# Patient Record
Sex: MALE | Race: WHITE | NOT HISPANIC OR LATINO | URBAN - METROPOLITAN AREA
[De-identification: names, ages, dates, MRNs, and addresses within clinical notes are randomized per-mention and may not be internally consistent; named-entity substitution may affect disease eponyms.]

---

## 2022-10-12 ENCOUNTER — INPATIENT (INPATIENT)
Facility: HOSPITAL | Age: 77
LOS: 4 days | Discharge: NURSING, REHAB OR LONG-TERM CARE | End: 2022-10-17
Attending: INTERNAL MEDICINE | Admitting: INTERNAL MEDICINE

## 2022-10-12 VITALS
WEIGHT: 259.93 LBS | RESPIRATION RATE: 18 BRPM | HEART RATE: 110 BPM | DIASTOLIC BLOOD PRESSURE: 66 MMHG | OXYGEN SATURATION: 98 % | SYSTOLIC BLOOD PRESSURE: 145 MMHG | TEMPERATURE: 99 F

## 2022-10-12 LAB
ALBUMIN SERPL ELPH-MCNC: 3.7 G/DL — SIGNIFICANT CHANGE UP (ref 3.5–5.2)
ALP SERPL-CCNC: 457 U/L — HIGH (ref 30–115)
ALT FLD-CCNC: 28 U/L — SIGNIFICANT CHANGE UP (ref 0–41)
ANION GAP SERPL CALC-SCNC: 17 MMOL/L — HIGH (ref 7–14)
APPEARANCE UR: ABNORMAL
AST SERPL-CCNC: 33 U/L — SIGNIFICANT CHANGE UP (ref 0–41)
BACTERIA # UR AUTO: ABNORMAL
BASOPHILS # BLD AUTO: 0.08 K/UL — SIGNIFICANT CHANGE UP (ref 0–0.2)
BASOPHILS NFR BLD AUTO: 0.9 % — SIGNIFICANT CHANGE UP (ref 0–1)
BILIRUB SERPL-MCNC: 0.3 MG/DL — SIGNIFICANT CHANGE UP (ref 0.2–1.2)
BILIRUB UR-MCNC: NEGATIVE — SIGNIFICANT CHANGE UP
BUN SERPL-MCNC: 74 MG/DL — CRITICAL HIGH (ref 10–20)
CALCIUM SERPL-MCNC: 8.3 MG/DL — LOW (ref 8.4–10.5)
CHLORIDE SERPL-SCNC: 108 MMOL/L — SIGNIFICANT CHANGE UP (ref 98–110)
CO2 SERPL-SCNC: 15 MMOL/L — LOW (ref 17–32)
COLOR SPEC: YELLOW — SIGNIFICANT CHANGE UP
CREAT SERPL-MCNC: 3.3 MG/DL — HIGH (ref 0.7–1.5)
DIFF PNL FLD: ABNORMAL
EGFR: 18 ML/MIN/1.73M2 — LOW
EOSINOPHIL # BLD AUTO: 0.16 K/UL — SIGNIFICANT CHANGE UP (ref 0–0.7)
EOSINOPHIL NFR BLD AUTO: 1.8 % — SIGNIFICANT CHANGE UP (ref 0–8)
EPI CELLS # UR: 1 /HPF — SIGNIFICANT CHANGE UP (ref 0–5)
GLUCOSE SERPL-MCNC: 89 MG/DL — SIGNIFICANT CHANGE UP (ref 70–99)
GLUCOSE UR QL: NEGATIVE — SIGNIFICANT CHANGE UP
HCT VFR BLD CALC: 27.7 % — LOW (ref 42–52)
HGB BLD-MCNC: 8.9 G/DL — LOW (ref 14–18)
HYALINE CASTS # UR AUTO: 2 /LPF — SIGNIFICANT CHANGE UP (ref 0–7)
IMM GRANULOCYTES NFR BLD AUTO: 0.6 % — HIGH (ref 0.1–0.3)
KETONES UR-MCNC: SIGNIFICANT CHANGE UP
LEUKOCYTE ESTERASE UR-ACNC: ABNORMAL
LYMPHOCYTES # BLD AUTO: 1.21 K/UL — SIGNIFICANT CHANGE UP (ref 1.2–3.4)
LYMPHOCYTES # BLD AUTO: 13.9 % — LOW (ref 20.5–51.1)
MCHC RBC-ENTMCNC: 28.4 PG — SIGNIFICANT CHANGE UP (ref 27–31)
MCHC RBC-ENTMCNC: 32.1 G/DL — SIGNIFICANT CHANGE UP (ref 32–37)
MCV RBC AUTO: 88.5 FL — SIGNIFICANT CHANGE UP (ref 80–94)
MONOCYTES # BLD AUTO: 0.67 K/UL — HIGH (ref 0.1–0.6)
MONOCYTES NFR BLD AUTO: 7.7 % — SIGNIFICANT CHANGE UP (ref 1.7–9.3)
NEUTROPHILS # BLD AUTO: 6.53 K/UL — HIGH (ref 1.4–6.5)
NEUTROPHILS NFR BLD AUTO: 75.1 % — SIGNIFICANT CHANGE UP (ref 42.2–75.2)
NITRITE UR-MCNC: NEGATIVE — SIGNIFICANT CHANGE UP
NRBC # BLD: 0 /100 WBCS — SIGNIFICANT CHANGE UP (ref 0–0)
NT-PROBNP SERPL-SCNC: 1174 PG/ML — HIGH (ref 0–300)
PH UR: 6 — SIGNIFICANT CHANGE UP (ref 5–8)
PLATELET # BLD AUTO: 378 K/UL — SIGNIFICANT CHANGE UP (ref 130–400)
POTASSIUM SERPL-MCNC: 5.4 MMOL/L — HIGH (ref 3.5–5)
POTASSIUM SERPL-SCNC: 5.4 MMOL/L — HIGH (ref 3.5–5)
PROT SERPL-MCNC: 6.7 G/DL — SIGNIFICANT CHANGE UP (ref 6–8)
PROT UR-MCNC: ABNORMAL
RBC # BLD: 3.13 M/UL — LOW (ref 4.7–6.1)
RBC # FLD: 15.9 % — HIGH (ref 11.5–14.5)
RBC CASTS # UR COMP ASSIST: 13 /HPF — HIGH (ref 0–4)
SARS-COV-2 RNA SPEC QL NAA+PROBE: SIGNIFICANT CHANGE UP
SODIUM SERPL-SCNC: 140 MMOL/L — SIGNIFICANT CHANGE UP (ref 135–146)
SP GR SPEC: 1.01 — SIGNIFICANT CHANGE UP (ref 1.01–1.03)
TROPONIN T SERPL-MCNC: 0.14 NG/ML — CRITICAL HIGH
UROBILINOGEN FLD QL: SIGNIFICANT CHANGE UP
WBC # BLD: 8.7 K/UL — SIGNIFICANT CHANGE UP (ref 4.8–10.8)
WBC # FLD AUTO: 8.7 K/UL — SIGNIFICANT CHANGE UP (ref 4.8–10.8)
WBC UR QL: 508 /HPF — HIGH (ref 0–5)

## 2022-10-12 PROCEDURE — 99285 EMERGENCY DEPT VISIT HI MDM: CPT

## 2022-10-12 PROCEDURE — 93010 ELECTROCARDIOGRAM REPORT: CPT

## 2022-10-12 PROCEDURE — 93970 EXTREMITY STUDY: CPT | Mod: 26

## 2022-10-12 PROCEDURE — 71045 X-RAY EXAM CHEST 1 VIEW: CPT | Mod: 26

## 2022-10-12 PROCEDURE — 99223 1ST HOSP IP/OBS HIGH 75: CPT

## 2022-10-12 RX ORDER — SODIUM BICARBONATE 1 MEQ/ML
650 SYRINGE (ML) INTRAVENOUS EVERY 8 HOURS
Refills: 0 | Status: DISCONTINUED | OUTPATIENT
Start: 2022-10-12 | End: 2022-10-14

## 2022-10-12 RX ORDER — HEPARIN SODIUM 5000 [USP'U]/ML
5000 INJECTION INTRAVENOUS; SUBCUTANEOUS EVERY 8 HOURS
Refills: 0 | Status: DISCONTINUED | OUTPATIENT
Start: 2022-10-12 | End: 2022-10-17

## 2022-10-12 RX ORDER — AMLODIPINE BESYLATE 2.5 MG/1
5 TABLET ORAL DAILY
Refills: 0 | Status: DISCONTINUED | OUTPATIENT
Start: 2022-10-12 | End: 2022-10-17

## 2022-10-12 NOTE — H&P ADULT - NSHPREVIEWOFSYSTEMS_GEN_ALL_CORE
REVIEW OF SYSTEMS:    CONSTITUTIONAL: + generalized weakness   EYES/ENT: No visual changes;  No vertigo or throat pain   NECK: No pain or stiffness  RESPIRATORY: No cough, wheezing, hemoptysis; + chronic sob at baseline   CARDIOVASCULAR: No chest pain or palpitations, + bilateral lower extremity edema  GASTROINTESTINAL: No abdominal or epigastric pain. No nausea, vomiting, or hematemesis; No constipation. +diarrhea   GENITOURINARY: No dysuria, frequency or hematuria  NEUROLOGICAL: No numbness or weakness  SKIN: No itching, rashes REVIEW OF SYSTEMS:    CONSTITUTIONAL: + generalized weakness   EYES/ENT: No visual changes;  No vertigo or throat pain   NECK: No pain or stiffness  RESPIRATORY: No cough, wheezing, hemoptysis; + chronic sob at baseline   CARDIOVASCULAR: No chest pain or palpitations, + bilateral lower extremity edema  GASTROINTESTINAL: No abdominal or epigastric pain. No nausea, vomiting, or hematemesis; No constipation. +diarrhea   GENITOURINARY: No dysuria, frequency or hematuria

## 2022-10-12 NOTE — ED ADULT NURSE NOTE - OBJECTIVE STATEMENT
pt presents to the ed c/o worsening b/l LE swelling. Pt was noted to have +4B/l LE edema, fluid filled blisters, b/l LE is leathery and has hyperpigmentation. Pt was noted to have redness around fluid filled blisters. Pt was noted to be coughing and expectorates light yellow phlegm. Pt reports BASS and denies chest pain or palpitations. B/l Breath sounds are clear.

## 2022-10-12 NOTE — H&P ADULT - NSHPLABSRESULTS_GEN_ALL_CORE
.  LABS:                         8.9    8.70  )-----------( 378      ( 12 Oct 2022 15:04 )             27.7     1012    140  |  108  |  74<HH>  ----------------------------<  89  5.4<H>   |  15<L>  |  3.3<H>    Ca    8.3<L>      12 Oct 2022 15:04    TPro  6.7  /  Alb  3.7  /  TBili  0.3  /  DBili  x   /  AST  33  /  ALT  28  /  AlkPhos  457<H>  1012      Urinalysis Basic - ( 12 Oct 2022 16:45 )    Color: Yellow / Appearance: Slightly Turbid / S.011 / pH: x  Gluc: x / Ketone: Trace  / Bili: Negative / Urobili: <2 mg/dL   Blood: x / Protein: 30 mg/dL / Nitrite: Negative   Leuk Esterase: Large / RBC: 13 /HPF /  /HPF   Sq Epi: x / Non Sq Epi: 1 /HPF / Bacteria: Many      Serum Pro-Brain Natriuretic Peptide: 1174 pg/mL (10-12 @ 15:04)        RADIOLOGY, EKG & ADDITIONAL TESTS: Reviewed.

## 2022-10-12 NOTE — H&P ADULT - ATTENDING COMMENTS
78 YO M w/ a PMH of DM2, HTN, and CKD3 who was BIBEMS for eval of progressive B/L LE swelling over the past x 6 months, worse over the past x 2 weeks. Associated w/ BASS and decreased ambulation. ROS is positive for ulcer on buttocks and left hip, otherwise negative.     In the ED, Chest X-Ray w/ left-sided ovoid opacity. Doppler of B/L LEs was negative.     FMHx: Reviewed, not relevant    Physical exam shows obese pt in NAD. VSS, afebrile, not hypoxic on RA. A&Ox3. Neuro exam without deficits, motor/sensory intact, no dysarthria, no facial asymmetry. Muscle strength/sensation intact. CTA B/L with no W/C/R. RRR, no M/G/R. ABD is soft and non-tender, normoactive BSs. B/L LEs w/ 2+ pitting edema, there is lipodermatosclerotic skin changes present. There are 2 small (<1x1 cm) sacral ulcers present and an ulcer on the left hip; minimal surrounding erythema, no purulence, no foul smell. Labs and radiology as above.     B/L LE swelling + dyspnea, suspect fluid overload from progression of CKD vs type 3 RCS. Echo. Monitor daily weights, Is&Os, and diet/fluid restriction. Send UA, urine lytes, urine pr:cr ratio, and trend BMP. Monitor urinary out-put. PTH and Vit D. Hold nephrotoxic agents. Restart home meds. Renal consult.     NAGMA (Non-AG Metabolic Acidosis) from Renal. Obtain VBG. Start on PO bicarb. Repeat BMP in the AM    Normocytic anemia, unknown baseline. Pt denies bleeding symptoms. Send anemia work-up. Replace as necessary.     Troponin elevation, suspected cause is CKD. Doubt ACS. No CP or EKG changes. Serial cardiac enzymes and EKGs.     Ovoid opacity on Chest X-Ray. Obtain CT-chest non-con    Hx of DM2 and HTN. Restart home meds, except as stated above. DVT PPX. Inform PCP of pt's admission to hospital. My note supersedes the residents note.     Date seen by Attending: 10/12/22 78 YO M w/ a PMH of DM2, HTN, and CKD3 who was BIBEMS for eval of progressive B/L LE swelling over the past x 6 months, worse over the past x 2 weeks. Associated w/ BASS and decreased ambulation. ROS is positive for ulcer on buttocks and left hip, otherwise negative.     In the ED, Chest X-Ray w/ left-sided ovoid opacity. Doppler of B/L LEs was negative.     FMHx: Reviewed, not relevant    Physical exam shows obese pt in NAD. VSS, afebrile, not hypoxic on RA. A&Ox3. Neuro exam without deficits, motor/sensory intact, no dysarthria, no facial asymmetry. Muscle strength/sensation intact. CTA B/L with no W/C/R. RRR, no M/G/R. ABD is soft and non-tender, normoactive BSs. B/L LEs w/ 2+ pitting edema, there is lipodermatosclerotic skin changes present. There are 2 small (<1x1 cm) sacral ulcers present and an ulcer on the left hip; minimal surrounding erythema, no purulence, no foul smell. Labs and radiology as above.     B/L LE swelling + dyspnea, suspect fluid overload from progression of CKD vs type 3 RCS. Echo. Monitor daily weights, Is&Os, and diet/fluid restriction. Send UA, urine lytes, urine pr:cr ratio, and trend BMP. Monitor urinary out-put. PTH and Vit D. Hold nephrotoxic agents. Restart home meds. Renal consult.     NAGMA (Non-AG Metabolic Acidosis) from Renal. Obtain VBG. Start on PO bicarb. Repeat BMP in the AM    Normocytic anemia, unknown baseline. Pt denies bleeding symptoms. Send anemia work-up. Replace as necessary.     Troponin elevation, suspected cause is CKD. Doubt ACS. No CP or EKG changes. Serial cardiac enzymes and EKGs.     Ovoid opacity on Chest X-Ray. Obtain CT-chest non-con    Sacral/hip ulcerations. Wound care. Burn eval.     Hx of DM2 and HTN. Restart home meds, except as stated above. DVT PPX. Inform PCP of pt's admission to hospital. My note supersedes the residents note.     Date seen by Attending: 10/12/22

## 2022-10-12 NOTE — H&P ADULT - HISTORY OF PRESENT ILLNESS
77 year old male with a history of DM poorly controlled and HTN presents to the ED with bilateral lower extremity edema. Patient reports that his legs have been swollen for months. In the past few weeks swelling has worsened and his family who is at bedside noted that patient has limited capability to ambulate despite use of a walker. He has been spending most of his time sitting and/or laying down and has developed decubitus ulcer on his buttock and proximal left leg. Also notes ulcerations of his right shin with clear liquid drainage. Admits to cramping in the legs and weakness bilaterally. No erythema of the legs, numbness/tingling, bruising. Admits to chronic dyspnea on exertion and urinary frequency. Further admits to excessive loose stools and diarrhea over the past few weeks, no blood. Denies fever/chills, chest pain, abdominal pain, nausea/vomiting, constipation. 77 year old male with a history of DM poorly controlled and HTN presents to the ED with bilateral lower extremity edema. Patient reports that his legs have been swollen for months. In the past few weeks swelling has worsened and his family who is at bedside noted that patient has limited capability to ambulate despite use of a walker. He has been spending most of his time sitting and/or laying down and has developed decubitus ulcer on his buttock and proximal left leg. Also notes ulcerations of his right shin with clear liquid drainage. Admits to cramping in the legs and weakness bilaterally. No erythema of the legs, numbness/tingling, bruising. Admits to chronic dyspnea on exertion and urinary frequency. Further admits to excessive loose stools and diarrhea over the past few weeks, no blood. Denies fever/chills, chest pain, abdominal pain, nausea/vomiting, constipation.  Patient has not seen a doctor since before the pandemic. His current medication list is  from 2020.     ED course : /66, , RR 18, T 98.7, Spo2 98 on RA . Labs significant for hgb 8.9, K 5.4, Hco3 15, AG 17, BUN 74, Cr 3.3, trop 0.14, BNP 1174. CXR notable for Patchy left basilar opacities, noting the dominant opacity is somewhat ovoid measuring up to 2.6 cm. B/l Lower extremity duplex showed no evidence of deep venous thrombosis or superficial thrombophlebitis in the bilateral lower extremities.

## 2022-10-12 NOTE — H&P ADULT - NSICDXPASTMEDICALHX_GEN_ALL_CORE_FT
PAST MEDICAL HISTORY:  CKD (chronic kidney disease)     Hypertension     Type 2 diabetes mellitus

## 2022-10-12 NOTE — H&P ADULT - NSHPPHYSICALEXAM_GEN_ALL_CORE
LOS:     VITALS:   T(C): 37 (10-12-22 @ 21:30), Max: 37.1 (10-12-22 @ 13:35)  HR: 105 (10-12-22 @ 21:30) (105 - 110)  BP: 137/63 (10-12-22 @ 21:30) (137/63 - 145/66)  RR: 18 (10-12-22 @ 21:30) (18 - 18)  SpO2: 100% (10-12-22 @ 21:30) (98% - 100%)    GENERAL: NAD, lying in bed comfortably  HEAD:  Atraumatic, Normocephalic  EYES: EOMI, PERRLA, conjunctiva and sclera clear  ENT: Moist mucous membranes  NECK: Supple, No JVD  CHEST/LUNG: Clear to auscultation bilaterally; No rales, rhonchi, wheezing, or rubs. Unlabored respirations  HEART: Regular rate and rhythm; No murmurs, rubs, or gallops  ABDOMEN: BSx4; obese, nontender   EXTREMITIES:  unable to assess pulses due to body habitus, B/l 3+ LE Edema L>R.  Chronic venous stasis skin changes. No actively oozing wounds   NERVOUS SYSTEM:  A&Ox3, no focal deficits   SKIN: No rashes or lesions

## 2022-10-12 NOTE — ED PROVIDER NOTE - PHYSICAL EXAMINATION
Physical Exam    Constitutional: No acute distress.   Eyes: Conjunctiva pink, Sclera clear, PERRLA, EOMI.  ENT: No sinus tenderness. No nasal discharge. No oropharyngeal erythema, edema, or exudates. Uvula midline.   Cardiovascular: tachycardic, regular rhythm. No noted murmurs rubs or gallops.  Respiratory: unlabored respiratory effort, clear to auscultation bilaterally no wheezing, rales or rhonchi  Gastrointestinal: Normal bowel sounds. soft, non distended, non-tender abdomen.   Musculoskeletal: supple neck, no midline tenderness. No joint or bony deformity.   Extremities: Bilateral lower extremity 3+ pitting edema. Ulceration over anterior plane of distal RLE with serious discharge. No erythema or bruising. No DFU.   Integumentary: warm, dry. Onychomycosis of toes bilaterally.   Neurologic: awake, alert, cranial nerves II-XII grossly intact, extremities’ motor and sensory functions grossly intact  Psychiatric: appropriate mood, appropriate affect

## 2022-10-12 NOTE — ED ADULT TRIAGE NOTE - CHIEF COMPLAINT QUOTE
Patient complaining of bilateral leg swelling x 2 weeks assocuated with difficulty ambulating,. Denies any shortness of breath

## 2022-10-12 NOTE — ED ADULT NURSE NOTE - DOES PATIENT HAVE ADVANCE DIRECTIVE
No
Patient presents to ER complaining of abdominal pain, patient reports burning pain on post surgery incisions, patient state had hernia surgery last week and has been experiencing pain since yesterday, patient ran out of pain medication, no signs/symptoms of infection, site are clean/intact, no redness/swelling/drainage noted, patient denies fever.

## 2022-10-12 NOTE — ED PROVIDER NOTE - NS ED ATTENDING STATEMENT MOD
This was a shared visit with the ADÁN. I reviewed and verified the documentation and independently performed the documented:

## 2022-10-12 NOTE — ED PROVIDER NOTE - CARE PLAN
1 Principal Discharge DX:	Bilateral lower extremity edema  Secondary Diagnosis:	Diabetes mellitus  Secondary Diagnosis:	Acute on chronic renal failure  Secondary Diagnosis:	Elevated troponin  Secondary Diagnosis:	Anemia

## 2022-10-12 NOTE — ED ADULT NURSE NOTE - CHIEF COMPLAINT QUOTE
Patient complaining of bilateral leg swelling x 2 weeks assocated with difficulty ambulating,. Denies any shortness of breath

## 2022-10-12 NOTE — ED PROVIDER NOTE - OBJECTIVE STATEMENT
77 year old male with a history of DM poorly controlled and HTN presents to the ED with bilateral lower extremity edema. Patient reports that his legs have been swollen for months. In the past few weeks swelling has worsened and his family who is at bedside noted that patient has limited capability to ambulate despite use of a walker. He has been spending most of his time sitting and/or laying down and has developed decubitus ulcer on his buttock and proximal left leg. Also notes ulcerations of his right shin with clear liquid drainage. Admits to cramping in the legs and weakness bilaterally. No erythema of the legs, numbness/tingling, bruising. Admits to chronic dyspnea on exertion and urinary frequency. Further admits to excessive loose stools and diarrhea over the past few weeks, no blood. Denies fever/chills, chest pain, abdominal pain, nausea/vomiting, constipation.

## 2022-10-12 NOTE — ED PROVIDER NOTE - NS ED ROS FT
Constitutional: (-) fever (+) generalized weakness  Eyes/ENT: (-) blurry vision, (-) epistaxis  Cardiovascular: (-) chest pain, (-) syncope  Respiratory: (-) cough, (+) chronic shortness of breath  Gastrointestinal: (-) abdominal pain (-) nausea (-) vomiting, (+) diarrhea  Musculoskeletal: (-) neck pain, (-) back pain, (-) joint pain  Extremities: (+) bilateral lower extremity edema (+) cramping (-) numbness/tingling (+) ulcerations  Neurological: (-) headache, (-) altered mental status  Psychiatric: (-) hallucinations  Allergic/Immunologic: (-) pruritus

## 2022-10-12 NOTE — ED ADULT NURSE NOTE - NSIMPLEMENTINTERV_GEN_ALL_ED
Implemented All Fall with Harm Risk Interventions:  [place] to call system. Call bell, personal items and telephone within reach. Instruct patient to call for assistance. Room bathroom lighting operational. Non-slip footwear when patient is off stretcher. Physically safe environment: no spills, clutter or unnecessary equipment. Stretcher in lowest position, wheels locked, appropriate side rails in place. Provide visual cue, wrist band, yellow gown, etc. Monitor gait and stability. Monitor for mental status changes and reorient to person, place, and time. Review medications for side effects contributing to fall risk. Reinforce activity limits and safety measures with patient and family. Provide visual clues: red socks.

## 2022-10-12 NOTE — ED PROVIDER NOTE - ATTENDING APP SHARED VISIT CONTRIBUTION OF CARE
76 yo m with pmh of htn and dm, bib family member for b/l leg swelling, difficulty walking and caring for himself.  pt lives by himself.  family member came from [place] because was concerned that he was complaining of diarrhea and seemed weak on the phone.  pt admits has had swelling of his legs for many months.  has sob but no cp.  admits had diarrhea for he past few weeks.  found by family member in unkempt house.  family is concerned that he is no longer safe to live by himself.  exam: nad, ncat, perrl, eomi, mmm, rrr, bibasilar rales, abd soft, nt, nd aox3, +pitting edema with stasis ulcers, decubitus ulcer on buttock and L leg.  imp: pt with leg swelling, rales, difficulty walking, will r/o fluid overload,  ekg, cxr, labs

## 2022-10-12 NOTE — H&P ADULT - ASSESSMENT
77 year old male with a history of DM poorly controlled and HTN presents to the ED with bilateral lower extremity edema. Patient reports that his legs have been swollen for months. Has not seen a doctor since before the pandemic. ED course : /66, , RR 18, T 98.7, Spo2 98 on RA . Labs significant for hgb 8.9, K 5.4, Hco3 15, AG 17, BUN 74, Cr 3.3, trop 0.14, BNP 1174. CXR notable for Patchy left basilar opacities, noting the dominant opacity is somewhat ovoid measuring up to 2.6 cm. B/l Lower extremity duplex showed no evidence of deep venous thrombosis or superficial thrombophlebitis in the bilateral lower extremities.        #Troponemia   #Elevated BNP   - likely 2/2 to Advance CKD   -not complaining of chest pain   -f/u echo     #Hx of DM  - F/u A1C , lipid profile   -start insulin if POC glucose consistently > 180     #Chronic Venous Stasis Changes of LE  #Sacral Wound   -BURN eval       #Hx of CKD  #Hyperkalemia   #YARIEL  -Unsure of baseline cr  - F/u urine studies  -RBUS ordered   -Start NaHCO3 650 q 8   -f/u phos level  -nephro eval       #Hx of HTN   - BP elevated on presentation 145/66  -  Home meds were: Chlorthalidone 50, Amlodipine/valsartan 10/320, Bystolic 5   - Start amlodipine 5 mg   - Monitor BP     #Chronic SOB  #Former Smoker  #Suspicious L opacity   -ct chest non con ordered   - pulm consult   -will need pfts outpatient     #Normocytic Anemia  #Suspected Anemia of chronic disease  -f/u Iron studies, b12, folate   -keep active type and screen   - Will need age appropriate cancer screening outpatient     #+ UA  -no dysuria , suprapubic pain , cva tenderness   -monitor off abx  -f/u urine cx      DVT ppx: Heparin  Diet: DASH  Activity: IAT  Code: FULL     Dispo: Acute

## 2022-10-13 LAB
A1C WITH ESTIMATED AVERAGE GLUCOSE RESULT: 5.8 % — HIGH (ref 4–5.6)
ANION GAP SERPL CALC-SCNC: 13 MMOL/L — SIGNIFICANT CHANGE UP (ref 7–14)
ANION GAP SERPL CALC-SCNC: 17 MMOL/L — HIGH (ref 7–14)
BASOPHILS # BLD AUTO: 0.1 K/UL — SIGNIFICANT CHANGE UP (ref 0–0.2)
BASOPHILS NFR BLD AUTO: 1.2 % — HIGH (ref 0–1)
BUN SERPL-MCNC: 67 MG/DL — CRITICAL HIGH (ref 10–20)
BUN SERPL-MCNC: 71 MG/DL — CRITICAL HIGH (ref 10–20)
CALCIUM SERPL-MCNC: 7.9 MG/DL — LOW (ref 8.4–10.5)
CALCIUM SERPL-MCNC: 8.2 MG/DL — LOW (ref 8.4–10.5)
CHLORIDE SERPL-SCNC: 112 MMOL/L — HIGH (ref 98–110)
CHLORIDE SERPL-SCNC: 112 MMOL/L — HIGH (ref 98–110)
CHOLEST SERPL-MCNC: 128 MG/DL — SIGNIFICANT CHANGE UP
CO2 SERPL-SCNC: 14 MMOL/L — LOW (ref 17–32)
CO2 SERPL-SCNC: 17 MMOL/L — SIGNIFICANT CHANGE UP (ref 17–32)
CREAT SERPL-MCNC: 3.2 MG/DL — HIGH (ref 0.7–1.5)
CREAT SERPL-MCNC: 3.7 MG/DL — HIGH (ref 0.7–1.5)
EGFR: 16 ML/MIN/1.73M2 — LOW
EGFR: 19 ML/MIN/1.73M2 — LOW
EOSINOPHIL # BLD AUTO: 0.19 K/UL — SIGNIFICANT CHANGE UP (ref 0–0.7)
EOSINOPHIL NFR BLD AUTO: 2.2 % — SIGNIFICANT CHANGE UP (ref 0–8)
ESTIMATED AVERAGE GLUCOSE: 120 MG/DL — HIGH (ref 68–114)
GLUCOSE SERPL-MCNC: 133 MG/DL — HIGH (ref 70–99)
GLUCOSE SERPL-MCNC: 140 MG/DL — HIGH (ref 70–99)
HCT VFR BLD CALC: 25.3 % — LOW (ref 42–52)
HCT VFR BLD CALC: 25.6 % — LOW (ref 42–52)
HDLC SERPL-MCNC: 49 MG/DL — SIGNIFICANT CHANGE UP
HGB BLD-MCNC: 8.2 G/DL — LOW (ref 14–18)
HGB BLD-MCNC: 8.3 G/DL — LOW (ref 14–18)
IMM GRANULOCYTES NFR BLD AUTO: 1.1 % — HIGH (ref 0.1–0.3)
LIPID PNL WITH DIRECT LDL SERPL: 49 MG/DL — SIGNIFICANT CHANGE UP
LYMPHOCYTES # BLD AUTO: 1.07 K/UL — LOW (ref 1.2–3.4)
LYMPHOCYTES # BLD AUTO: 12.6 % — LOW (ref 20.5–51.1)
MAGNESIUM SERPL-MCNC: 1.2 MG/DL — LOW (ref 1.8–2.4)
MCHC RBC-ENTMCNC: 28.3 PG — SIGNIFICANT CHANGE UP (ref 27–31)
MCHC RBC-ENTMCNC: 28.3 PG — SIGNIFICANT CHANGE UP (ref 27–31)
MCHC RBC-ENTMCNC: 32.4 G/DL — SIGNIFICANT CHANGE UP (ref 32–37)
MCHC RBC-ENTMCNC: 32.4 G/DL — SIGNIFICANT CHANGE UP (ref 32–37)
MCV RBC AUTO: 87.2 FL — SIGNIFICANT CHANGE UP (ref 80–94)
MCV RBC AUTO: 87.4 FL — SIGNIFICANT CHANGE UP (ref 80–94)
MONOCYTES # BLD AUTO: 0.74 K/UL — HIGH (ref 0.1–0.6)
MONOCYTES NFR BLD AUTO: 8.7 % — SIGNIFICANT CHANGE UP (ref 1.7–9.3)
NEUTROPHILS # BLD AUTO: 6.33 K/UL — SIGNIFICANT CHANGE UP (ref 1.4–6.5)
NEUTROPHILS NFR BLD AUTO: 74.2 % — SIGNIFICANT CHANGE UP (ref 42.2–75.2)
NON HDL CHOLESTEROL: 79 MG/DL — SIGNIFICANT CHANGE UP
NRBC # BLD: 0 /100 WBCS — SIGNIFICANT CHANGE UP (ref 0–0)
NRBC # BLD: 0 /100 WBCS — SIGNIFICANT CHANGE UP (ref 0–0)
OSMOLALITY UR: 341 MOS/KG — SIGNIFICANT CHANGE UP (ref 50–1200)
PHOSPHATE SERPL-MCNC: 4.9 MG/DL — SIGNIFICANT CHANGE UP (ref 2.1–4.9)
PLATELET # BLD AUTO: 351 K/UL — SIGNIFICANT CHANGE UP (ref 130–400)
PLATELET # BLD AUTO: 354 K/UL — SIGNIFICANT CHANGE UP (ref 130–400)
POTASSIUM SERPL-MCNC: 4.8 MMOL/L — SIGNIFICANT CHANGE UP (ref 3.5–5)
POTASSIUM SERPL-MCNC: 4.9 MMOL/L — SIGNIFICANT CHANGE UP (ref 3.5–5)
POTASSIUM SERPL-SCNC: 4.8 MMOL/L — SIGNIFICANT CHANGE UP (ref 3.5–5)
POTASSIUM SERPL-SCNC: 4.9 MMOL/L — SIGNIFICANT CHANGE UP (ref 3.5–5)
POTASSIUM UR-SCNC: 8 MMOL/L — SIGNIFICANT CHANGE UP
RBC # BLD: 2.9 M/UL — LOW (ref 4.7–6.1)
RBC # BLD: 2.93 M/UL — LOW (ref 4.7–6.1)
RBC # FLD: 15.7 % — HIGH (ref 11.5–14.5)
RBC # FLD: 15.8 % — HIGH (ref 11.5–14.5)
SODIUM SERPL-SCNC: 142 MMOL/L — SIGNIFICANT CHANGE UP (ref 135–146)
SODIUM SERPL-SCNC: 143 MMOL/L — SIGNIFICANT CHANGE UP (ref 135–146)
SODIUM UR-SCNC: 128 MMOL/L — SIGNIFICANT CHANGE UP
TRIGL SERPL-MCNC: 152 MG/DL — HIGH
TROPONIN T SERPL-MCNC: 0.11 NG/ML — CRITICAL HIGH
TROPONIN T SERPL-MCNC: 0.12 NG/ML — CRITICAL HIGH
WBC # BLD: 8.52 K/UL — SIGNIFICANT CHANGE UP (ref 4.8–10.8)
WBC # BLD: 8.82 K/UL — SIGNIFICANT CHANGE UP (ref 4.8–10.8)
WBC # FLD AUTO: 8.52 K/UL — SIGNIFICANT CHANGE UP (ref 4.8–10.8)
WBC # FLD AUTO: 8.82 K/UL — SIGNIFICANT CHANGE UP (ref 4.8–10.8)

## 2022-10-13 PROCEDURE — 76770 US EXAM ABDO BACK WALL COMP: CPT | Mod: 26

## 2022-10-13 PROCEDURE — 71250 CT THORAX DX C-: CPT | Mod: 26

## 2022-10-13 PROCEDURE — 93306 TTE W/DOPPLER COMPLETE: CPT | Mod: 26

## 2022-10-13 PROCEDURE — 99222 1ST HOSP IP/OBS MODERATE 55: CPT

## 2022-10-13 PROCEDURE — 99221 1ST HOSP IP/OBS SF/LOW 40: CPT | Mod: FS

## 2022-10-13 PROCEDURE — 99233 SBSQ HOSP IP/OBS HIGH 50: CPT

## 2022-10-13 RX ORDER — FUROSEMIDE 40 MG
40 TABLET ORAL ONCE
Refills: 0 | Status: COMPLETED | OUTPATIENT
Start: 2022-10-13 | End: 2022-10-13

## 2022-10-13 RX ORDER — SODIUM ZIRCONIUM CYCLOSILICATE 10 G/10G
10 POWDER, FOR SUSPENSION ORAL DAILY
Refills: 0 | Status: DISCONTINUED | OUTPATIENT
Start: 2022-10-13 | End: 2022-10-13

## 2022-10-13 RX ORDER — INFLUENZA VIRUS VACCINE 15; 15; 15; 15 UG/.5ML; UG/.5ML; UG/.5ML; UG/.5ML
0.7 SUSPENSION INTRAMUSCULAR ONCE
Refills: 0 | Status: DISCONTINUED | OUTPATIENT
Start: 2022-10-13 | End: 2022-10-17

## 2022-10-13 RX ORDER — MAGNESIUM SULFATE 500 MG/ML
2 VIAL (ML) INJECTION
Refills: 0 | Status: COMPLETED | OUTPATIENT
Start: 2022-10-13 | End: 2022-10-13

## 2022-10-13 RX ADMIN — Medication 650 MILLIGRAM(S): at 17:54

## 2022-10-13 RX ADMIN — Medication 650 MILLIGRAM(S): at 08:46

## 2022-10-13 RX ADMIN — HEPARIN SODIUM 5000 UNIT(S): 5000 INJECTION INTRAVENOUS; SUBCUTANEOUS at 21:24

## 2022-10-13 RX ADMIN — AMLODIPINE BESYLATE 5 MILLIGRAM(S): 2.5 TABLET ORAL at 08:46

## 2022-10-13 RX ADMIN — Medication 25 GRAM(S): at 22:32

## 2022-10-13 RX ADMIN — HEPARIN SODIUM 5000 UNIT(S): 5000 INJECTION INTRAVENOUS; SUBCUTANEOUS at 08:46

## 2022-10-13 RX ADMIN — Medication 40 MILLIGRAM(S): at 10:54

## 2022-10-13 RX ADMIN — HEPARIN SODIUM 5000 UNIT(S): 5000 INJECTION INTRAVENOUS; SUBCUTANEOUS at 17:53

## 2022-10-13 RX ADMIN — Medication 650 MILLIGRAM(S): at 21:24

## 2022-10-13 NOTE — PROGRESS NOTE ADULT - ASSESSMENT
77 year old male with a history of DM and HTN presents to the ED with bilateral lower extremity edema.    #Acute on Chronic HFpEF  #Chronic venous stasis b/l LE  #Left basilar opacity  #LLE ulcer  B/l Lower extremity duplex showed no evidence of deep venous thrombosis or superficial thrombophlebitis in the bilateral lower extremities.  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 Glucose was 89 on BMP  - f/u hba1c  - Start ISS if needed    #HTN  - Cont amlodipine 5mg qD    #CKD Stage 4  Pt endorses his creatinine has always been elevated, unsure of number but believes high 2s  - Cont sodium bicarb 650 q8h  - Nephro eval    DVT PPX, heparin    #Progress Note Handoff  Pending (specify): Burn Eval, Diurese as tolerated, nephro eval  Family discussion: d/w pt regarding eval for LE edema  Disposition:  77 year old male with a history of DM and HTN presents to the ED with bilateral lower extremity edema.    #Acute on Chronic HFpEF  #Chronic venous stasis b/l LE  #Left basilar opacity  #LLE ulcer  B/l Lower extremity duplex showed no evidence of deep venous thrombosis or superficial thrombophlebitis in the bilateral lower extremities.  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 Glucose was 89 on BMP  - f/u hba1c  - Start ISS if needed    #HTN  - Cont amlodipine 5mg qD    DVT PPX, heparin    #Progress Note Handoff  Pending (specify): Burn Eval, Diurese as tolerated, nephro eval  Family discussion: d/w pt regarding eval for LE edema  Disposition:

## 2022-10-13 NOTE — CONSULT NOTE ADULT - ASSESSMENT
Pt with DM, HTN, CKD 4, presents with increased LE edema and exertional dyspnes.    CKD 4 - will obtain old records, appears to be close to baseline creat ~ 3.0 as per pt  Kidney and bladders sono - no hydro, thin cortex  UA proteinuria 30, etiology likely HTN, DM  check SPC ratio  strict Is and OS  check phos, iPTH,   Hyperkalemia - low K diet  start Lokelma 10 grams tdaily    Met acidosis due to CKD  - Na bicarb 650 tid    Fluid overload - cont Lasix 40 IV qd and switch to po in 2 days  check 2D Echo    Anemia - check iron stores please, will need Venofer and DANIELLA most likely

## 2022-10-13 NOTE — PROGRESS NOTE ADULT - SUBJECTIVE AND OBJECTIVE BOX
NEVIN SHELL  77y, Male  Allergy: No Known Allergies    Hospital Day: 1d    Patient seen and examined. No acute events overnight    PMH/PSH:  PAST MEDICAL & SURGICAL HISTORY:  CKD (chronic kidney disease)      Hypertension      Type 2 diabetes mellitus          VITALS:  T(F): 98.6 (10-12-22 @ 21:30), Max: 98.7 (10-12-22 @ 13:35)  HR: 105 (10-12-22 @ 21:30)  BP: 137/63 (10-12-22 @ 21:30) (137/63 - 145/66)  RR: 18 (10-12-22 @ 21:30)  SpO2: 100% (10-12-22 @ 21:30)    TESTS & MEASUREMENTS:  Weight (Kg): 117.9 (10-12-22 @ 13:35)                            8.9    8.70  )-----------( 378      ( 12 Oct 2022 15:04 )             27.7       10-12    140  |  108  |  74<HH>  ----------------------------<  89  5.4<H>   |  15<L>  |  3.3<H>    Ca    8.3<L>      12 Oct 2022 15:04    TPro  6.7  /  Alb  3.7  /  TBili  0.3  /  DBili  x   /  AST  33  /  ALT  28  /  AlkPhos  457<H>  10-12    LIVER FUNCTIONS - ( 12 Oct 2022 15:04 )  Alb: 3.7 g/dL / Pro: 6.7 g/dL / ALK PHOS: 457 U/L / ALT: 28 U/L / AST: 33 U/L / GGT: x           CARDIAC MARKERS ( 12 Oct 2022 22:45 )  x     / 0.12 ng/mL / x     / x     / x      CARDIAC MARKERS ( 12 Oct 2022 15:04 )  x     / 0.14 ng/mL / x     / x     / x            Urinalysis Basic - ( 12 Oct 2022 16:45 )    Color: Yellow / Appearance: Slightly Turbid / S.011 / pH: x  Gluc: x / Ketone: Trace  / Bili: Negative / Urobili: <2 mg/dL   Blood: x / Protein: 30 mg/dL / Nitrite: Negative   Leuk Esterase: Large / RBC: 13 /HPF /  /HPF   Sq Epi: x / Non Sq Epi: 1 /HPF / Bacteria: Many        RADIOLOGY & ADDITIONAL TESTS:    RECENT DIAGNOSTIC ORDERS:  Basic Metabolic Panel: 16:00 (10-13-22 @ 12:01)  Complete Blood Count: 16:00 (10-13-22 @ 12:01)  Troponin T, Serum: 11:00 (10-13-22 @ 07:18)  Blood Gas Profile - Cord Venous: AM Sched. Collection:13-Oct-2022 04:30 (10-12-22 @ 22:54)  Lipid Profile: AM Sched. Collection: 13-Oct-2022 04:30 (10-12-22 @ 22:20)  A1C with Estimated Average Glucose: AM Sched. Collection: 13-Oct-2022 04:30 (10-12-22 @ 22:20)  CT Chest No Cont: Routine   Indication: l opacity  Transport: Stretcher-Crib  Exam Completed (10-12-22 @ 22:19)  Diet, DASH/TLC:   Sodium & Cholesterol Restricted (10-12-22 @ 22:16)  Phosphorus Level, Serum: AM Sched. Collection: 13-Oct-2022 04:30 (10-12-22 @ 22:15)  Potassium, Random Urine: STAT (10-12-22 @ 22:15)  Urea Nitrogen,  Random Urine: STAT (10-12-22 @ 22:15)  Osmolality, Random Urine: STAT (10-12-22 @ 22:15)  Protein/Creatinine Ratio, Urine: STAT (10-12-22 @ 22:15)  Sodium, Random Urine: STAT (10-12-22 @ 22:15)  Ferritin, Serum: AM Sched. Collection: 13-Oct-2022 04:30 (10-12-22 @ 22:15)  Iron with Total Binding Capacity: AM Sched. Collection: 13-Oct-2022 04:30 (10-12-22 @ 22:15)  ABO Rh Confirmatory Specimen: AM  Addl Info: Conditional: ABO Rh Confirmatory Specimen (10-12-22 @ 22:15)  Type + Screen: AM  Sched. Collection:13-Oct-2022 04:30 (10-12-22 @ 22:15)  Magnesium, Serum: AM Sched. Collection: 13-Oct-2022 04:30 (10-12-22 @ 22:15)  Basic Metabolic Panel: AM Sched. Collection: 13-Oct-2022 04:30 (10-12-22 @ 22:15)  Complete Blood Count + Automated Diff: AM Sched. Collection: 13-Oct-2022 04:30 (10-12-22 @ 22:15)  Culture - Urine: Routine  Specimen Source: Clean Catch (Midstream) (10-12-22 @ 14:23)      MEDICATIONS:  MEDICATIONS  (STANDING):  amLODIPine   Tablet 5 milliGRAM(s) Oral daily  heparin   Injectable 5000 Unit(s) SubCutaneous every 8 hours  sodium bicarbonate 650 milliGRAM(s) Oral every 8 hours    MEDICATIONS  (PRN):      HOME MEDICATIONS:      REVIEW OF SYSTEMS:  All other review of systems is negative unless indicated above.     PHYSICAL EXAM:  PHYSICAL EXAM:  GENERAL: NAD, well-developed  HEAD:  Atraumatic, Normocephalic  NECK: Supple, No JVD  CHEST/LUNG: Clear to auscultation bilaterally; No wheeze  HEART: Regular rate and rhythm; No murmurs, rubs, or gallops  ABDOMEN: Soft, Nontender, Nondistended; Bowel sounds present  EXTREMITIES:  2+ Peripheral Pulses, 2-3+ pitting edema b/l LE, chronic venous stasis changes, superficial ulcer RLE without drainage/pus  SKIN: No rashes or lesions

## 2022-10-13 NOTE — CONSULT NOTE ADULT - ASSESSMENT
Impression:    Acute volume overload  CHF exacerbation  JONH on CKD  Left lower lung opacity      Plan:  continue diuresis as tolerated  obtain CT chest  no evidence for active infection  obtain procalcitonin

## 2022-10-13 NOTE — CONSULT NOTE ADULT - ATTENDING COMMENTS
Impression:    Left lower lobe consolidation Vs atelectasis  Less likely PNA  Chronic kidney disease  Heart failure with preserved ejection fraction   Lower extremity edema     Impression and plan are my own

## 2022-10-13 NOTE — CONSULT NOTE ADULT - ASSESSMENT
Impression:    Left lower lobe consolidation Vs atelectasis  less likely PNA      Plan:    patient clinically appearing well  no fever leukocytosis, no cough or pleuritic chest pain  obtain procalcitonin  off abx for now  repeat CT in 6 weeks to f/u for resolution Impression:    Left lower lobe consolidation Vs atelectasis  Less likely PNA  Chronic kidney disease  HFPEF      Plan:    CT chest reviewed with LLL opacity with air bronchograms.  This could represent atelectasis vs consolidation in the right cinical setting.   Awaiting official CT chest report/read.   He has no fevers, no leukocytosis, no purulent phlegm/cough.   Check procalcitonin level. If negative then no need for abx.   Recommend speech and swallow evaluation to rule out aspiration.   Diuretic management per primary team and nephrology.   Given smoking history it would be reasonable to repeat CT chest in 6 weeks to document resolution/stability.   Will follow.

## 2022-10-13 NOTE — CONSULT NOTE ADULT - ASSESSMENT
78 yo male admitted for bilateral lower extremity edema/lymphedema  - no open wounds, no surgical intervention warranted at this time  - continue local wound care: wash with soap and water. hydrogel and allovyn to sacrum BID.   - consider vascular evaluation

## 2022-10-14 ENCOUNTER — TRANSCRIPTION ENCOUNTER (OUTPATIENT)
Age: 77
End: 2022-10-14

## 2022-10-14 LAB
A1C WITH ESTIMATED AVERAGE GLUCOSE RESULT: 5.9 % — HIGH (ref 4–5.6)
ALBUMIN SERPL ELPH-MCNC: 2.6 G/DL — LOW (ref 3.5–5.2)
ALP SERPL-CCNC: 357 U/L — HIGH (ref 30–115)
ALT FLD-CCNC: 19 U/L — SIGNIFICANT CHANGE UP (ref 0–41)
ANION GAP SERPL CALC-SCNC: 18 MMOL/L — HIGH (ref 7–14)
AST SERPL-CCNC: 24 U/L — SIGNIFICANT CHANGE UP (ref 0–41)
BILIRUB SERPL-MCNC: <0.2 MG/DL — SIGNIFICANT CHANGE UP (ref 0.2–1.2)
BUN SERPL-MCNC: 63 MG/DL — CRITICAL HIGH (ref 10–20)
CALCIUM SERPL-MCNC: 8.2 MG/DL — LOW (ref 8.4–10.5)
CALCIUM SERPL-MCNC: 8.3 MG/DL — LOW (ref 8.4–10.5)
CHLORIDE SERPL-SCNC: 113 MMOL/L — HIGH (ref 98–110)
CO2 SERPL-SCNC: 13 MMOL/L — LOW (ref 17–32)
CREAT ?TM UR-MCNC: 18 MG/DL — SIGNIFICANT CHANGE UP
CREAT SERPL-MCNC: 3.3 MG/DL — HIGH (ref 0.7–1.5)
EGFR: 18 ML/MIN/1.73M2 — LOW
ESTIMATED AVERAGE GLUCOSE: 123 MG/DL — HIGH (ref 68–114)
FERRITIN SERPL-MCNC: 346 NG/ML — SIGNIFICANT CHANGE UP (ref 30–400)
GLUCOSE SERPL-MCNC: 112 MG/DL — HIGH (ref 70–99)
HCT VFR BLD CALC: 26.7 % — LOW (ref 42–52)
HGB BLD-MCNC: 8.5 G/DL — LOW (ref 14–18)
MAGNESIUM SERPL-MCNC: 1.8 MG/DL — SIGNIFICANT CHANGE UP (ref 1.8–2.4)
MCHC RBC-ENTMCNC: 27.9 PG — SIGNIFICANT CHANGE UP (ref 27–31)
MCHC RBC-ENTMCNC: 31.8 G/DL — LOW (ref 32–37)
MCV RBC AUTO: 87.5 FL — SIGNIFICANT CHANGE UP (ref 80–94)
NRBC # BLD: 0 /100 WBCS — SIGNIFICANT CHANGE UP (ref 0–0)
PLATELET # BLD AUTO: 287 K/UL — SIGNIFICANT CHANGE UP (ref 130–400)
POTASSIUM SERPL-MCNC: 5.5 MMOL/L — HIGH (ref 3.5–5)
POTASSIUM SERPL-SCNC: 5.5 MMOL/L — HIGH (ref 3.5–5)
PROCALCITONIN SERPL-MCNC: 0.78 NG/ML — HIGH (ref 0.02–0.1)
PROT ?TM UR-MCNC: 11 MG/DLG/24H — SIGNIFICANT CHANGE UP
PROT SERPL-MCNC: 5.7 G/DL — LOW (ref 6–8)
PROT/CREAT UR-RTO: 0.6 RATIO — HIGH (ref 0–0.2)
PTH-INTACT FLD-MCNC: 118 PG/ML — HIGH (ref 15–65)
RBC # BLD: 3.05 M/UL — LOW (ref 4.7–6.1)
RBC # FLD: 15.9 % — HIGH (ref 11.5–14.5)
SODIUM SERPL-SCNC: 144 MMOL/L — SIGNIFICANT CHANGE UP (ref 135–146)
UUN UR-MCNC: 194 MG/DL — SIGNIFICANT CHANGE UP
WBC # BLD: 8.22 K/UL — SIGNIFICANT CHANGE UP (ref 4.8–10.8)
WBC # FLD AUTO: 8.22 K/UL — SIGNIFICANT CHANGE UP (ref 4.8–10.8)

## 2022-10-14 PROCEDURE — 99233 SBSQ HOSP IP/OBS HIGH 50: CPT

## 2022-10-14 RX ORDER — METOPROLOL TARTRATE 50 MG
25 TABLET ORAL DAILY
Refills: 0 | Status: DISCONTINUED | OUTPATIENT
Start: 2022-10-14 | End: 2022-10-17

## 2022-10-14 RX ORDER — SODIUM BICARBONATE 1 MEQ/ML
1300 SYRINGE (ML) INTRAVENOUS THREE TIMES A DAY
Refills: 0 | Status: DISCONTINUED | OUTPATIENT
Start: 2022-10-14 | End: 2022-10-17

## 2022-10-14 RX ORDER — FUROSEMIDE 40 MG
40 TABLET ORAL DAILY
Refills: 0 | Status: DISCONTINUED | OUTPATIENT
Start: 2022-10-15 | End: 2022-10-17

## 2022-10-14 RX ORDER — SODIUM ZIRCONIUM CYCLOSILICATE 10 G/10G
10 POWDER, FOR SUSPENSION ORAL DAILY
Refills: 0 | Status: DISCONTINUED | OUTPATIENT
Start: 2022-10-14 | End: 2022-10-14

## 2022-10-14 RX ORDER — FUROSEMIDE 40 MG
40 TABLET ORAL DAILY
Refills: 0 | Status: DISCONTINUED | OUTPATIENT
Start: 2022-10-14 | End: 2022-10-14

## 2022-10-14 RX ADMIN — HEPARIN SODIUM 5000 UNIT(S): 5000 INJECTION INTRAVENOUS; SUBCUTANEOUS at 05:30

## 2022-10-14 RX ADMIN — SODIUM ZIRCONIUM CYCLOSILICATE 10 GRAM(S): 10 POWDER, FOR SUSPENSION ORAL at 11:59

## 2022-10-14 RX ADMIN — HEPARIN SODIUM 5000 UNIT(S): 5000 INJECTION INTRAVENOUS; SUBCUTANEOUS at 12:30

## 2022-10-14 RX ADMIN — Medication 1300 MILLIGRAM(S): at 21:06

## 2022-10-14 RX ADMIN — Medication 1300 MILLIGRAM(S): at 14:34

## 2022-10-14 RX ADMIN — HEPARIN SODIUM 5000 UNIT(S): 5000 INJECTION INTRAVENOUS; SUBCUTANEOUS at 21:06

## 2022-10-14 RX ADMIN — Medication 650 MILLIGRAM(S): at 05:29

## 2022-10-14 RX ADMIN — AMLODIPINE BESYLATE 5 MILLIGRAM(S): 2.5 TABLET ORAL at 05:30

## 2022-10-14 RX ADMIN — Medication 25 MILLIGRAM(S): at 21:06

## 2022-10-14 NOTE — DISCHARGE NOTE PROVIDER - NSDCFUADDAPPT_GEN_ALL_CORE_FT
APPTS ARE READY TO BE MADE: [X ] YES    Best Family or Patient Contact (if needed): Demario Padilla, Phone # [phone]    Additional Information about above appointments (if needed):    1: Please follow up with your Kidney doctor for water pill adjustment as needed  2: Please follow up with your primary care doctor to do CT chest in 6 weeks for lung changes follow up, then go to your pulmonologist  3:     Other comments or requests:

## 2022-10-14 NOTE — PROGRESS NOTE ADULT - SUBJECTIVE AND OBJECTIVE BOX
SUBJECTIVE:    Patient is a 77y old Male who presents with a chief complaint of bilateral lower extremity edema (14 Oct 2022 12:53)    Currently admitted to medicine with the primary diagnosis of Bilateral lower extremity edema       Today is hospital day 2d. This morning he is resting comfortably in bed and reports no new issues or overnight events.     PAST MEDICAL & SURGICAL HISTORY  CKD (chronic kidney disease)    Hypertension    Type 2 diabetes mellitus      SOCIAL HISTORY:  Negative for smoking/alcohol/drug use.     ALLERGIES:  No Known Allergies    MEDICATIONS:  STANDING MEDICATIONS  amLODIPine   Tablet 5 milliGRAM(s) Oral daily  heparin   Injectable 5000 Unit(s) SubCutaneous every 8 hours  influenza  Vaccine (HIGH DOSE) 0.7 milliLiter(s) IntraMuscular once  metoprolol succinate ER 25 milliGRAM(s) Oral daily  sodium bicarbonate 1300 milliGRAM(s) Oral three times a day    PRN MEDICATIONS    VITALS:   T(F): 96.8  HR: 105  BP: 106/59  RR: 18  SpO2: 96%    LABS:                        8.5    8.22  )-----------( 287      ( 14 Oct 2022 07:26 )             26.7     10-14    144  |  113<H>  |  63<HH>  ----------------------------<  112<H>  5.5<H>   |  13<L>  |  3.3<H>    Ca    8.2<L>      14 Oct 2022 07:26  Phos  4.9     10-13  Mg     1.8     10-14    TPro  5.7<L>  /  Alb  2.6<L>  /  TBili  <0.2  /  DBili  x   /  AST  24  /  ALT  19  /  AlkPhos  357<H>  10-14      Urinalysis Basic - ( 12 Oct 2022 16:45 )    Color: Yellow / Appearance: Slightly Turbid / S.011 / pH: x  Gluc: x / Ketone: Trace  / Bili: Negative / Urobili: <2 mg/dL   Blood: x / Protein: 30 mg/dL / Nitrite: Negative   Leuk Esterase: Large / RBC: 13 /HPF /  /HPF   Sq Epi: x / Non Sq Epi: 1 /HPF / Bacteria: Many      Culture - Urine (collected 12 Oct 2022 16:45)  Source: Clean Catch Clean Catch (Midstream)  Preliminary Report (14 Oct 2022 11:57):    >100,000 CFU/ml Klebsiella pneumoniae      CARDIAC MARKERS ( 13 Oct 2022 12:42 )  x     / 0.11 ng/mL / x     / x     / x      CARDIAC MARKERS ( 12 Oct 2022 22:45 )  x     / 0.12 ng/mL / x     / x     / x          RADIOLOGY:    PHYSICAL EXAM:  GEN: No acute distress  LUNGS: Clear to auscultation bilaterally   HEART: Regular S1S2  ABD: Soft, non-tender, non-distended.  EXT: +2-3 edema, +PP  NEURO: AAOX3  Skin: left upper thigh/buttock DTI measuring  ~1inch in diameter    Intravenous access:   NG tube:   Cruz Catheter:

## 2022-10-14 NOTE — DISCHARGE NOTE PROVIDER - CARE PROVIDER_API CALL
Baljinder oCdy (DO)  Internal Medicine  [address] [facility], Admin - Room [address] [place] [address]  Phone: [phone]  Fax: [phone]  Follow Up Time: 2 weeks   Baljinder Cody ()  Internal Medicine  [address] [facility], Admin - [address]  [place] [address]  Phone: [phone]  Fax: [phone]  Follow Up Time: 2 weeks    Scott PengDPM)  Podiatric Medicine and Surgery  [address] [facility], 1st Floor, [address]  [place] [address]  Phone: [phone]  Fax: [phone]  Follow Up Time: 1 month    Pamela Bonilla)  Critical Care Medicine; Internal Medicine; Pulmonary Disease  [address] [place] [address]  Phone: [phone]  Fax: [phone]  Follow Up Time: 2 months    Belen Loyd)  Nephrology  [address] [facility], [address]  [place] [address]  Phone: [phone]  Fax: [phone]  Follow Up Time: 1 week

## 2022-10-14 NOTE — PROGRESS NOTE ADULT - ASSESSMENT
77 year old male with a history of HTN presents to the ED with severe bilateral lower extremity edema limiting his mobility likely 2/2 to advanced CKD that improved with lasix    #LLE  #Elevated BNP   - likely 2/2 to advanced CKD   - US kidney: thinning of left renal parenchyma, otherwise unremakable  -not complaining of chest pain   - Echo: LVH, normal LVEF 70%, no valve disease   - C/w lasix 40mg PO QD    # HTN   # tachycardia after stopping Bystolic  - BP elevated on presentation 145/66  - Home meds were: Chlorthalidone 50, Amlodipine/valsartan 10/320, Bystolic 5   - c/w amlodipine 5 mg   - add metoprolol 25mg   - Monitor BP     #Hx of DM?  - A1C 5.8% , lipid profile; LDL 49   -start insulin if POC glucose consistently > 180     # Chronic venous stasis b/l LE  - [facility] Duplex Lower Ext Vein Scan, Bilat (10.12.22 @ 15:56) >No evidence of deep venous thrombosis or superficial thrombophlebitis in the bilateral lower extremities. Unable to visualize calf veins due to edema.  - Burn eval: no open wounds, no surgical intervention warranted at this time  - continue local wound care: wash with soap and water. hydrogel and allovyn to sacrum BID.     # Left basilar opacity on imaging  -  CT Chest No Cont (10.13.22 @ 09:34) >Triangular/wedge-shaped opacity in the left upper lobe, likely corresponding to abnormality on recent chest radiograph which is nonspecific and of unclear etiology. Differential is broad and includes pneumonia versus atelectasis/scarring or loculated fluid. Follow-up posttreatment is needed to ensure no underlying mass.  - pulm eval: This could represent atelectasis vs consolidation in the right cinical setting.   - He has no fevers, no leukocytosis, no purulent phlegm/cough. Check procalcitonin level. If negative then no need for abx. Given smoking history it would be reasonable to repeat CT chest in 6 weeks to document resolution/stability.   - Procalcitonin, Serum: 0.78:(10.13.22 @ 18:34)  - oxygen sat 96 on RA    #Hx of CKD  #Hyperkalemia   #YARIEL  -renal US Somewhat thinned left renal parenchyma, otherwise unremarkable study.  -Unsure of baseline cr  -increase NaHCO3 [id number] q8h  -f/u phos level  -nephro eval     #Chronic SOB  #Former Smoker  #Suspicious L opacity   -ct chest non con ordered   - pulm consult   -will need pfts outpatient     #Normocytic Anemia  #Suspected Anemia of chronic disease  -f/u Iron studies, b12, folate   -keep active type and screen   - Will need age appropriate cancer screening outpatient     #+ UA  #Urince culture Klebiella   # aymptomatic -no dysuria , suprapubic pain , cva tenderness   -monitor off abx    DVT ppx: Heparin  Diet: , renal  Activity: IAT  Code: FULL     Dispo: prep for discharge in 24-48hrs

## 2022-10-14 NOTE — PHYSICAL THERAPY INITIAL EVALUATION ADULT - DIAGNOSIS, PT EVAL
Gait dysfunction secondary Bilateral lower extremity edema; Diabetes mellitus; Acute on chronic renal failure; Elevated troponin; Anemia

## 2022-10-14 NOTE — PROGRESS NOTE ADULT - SUBJECTIVE AND OBJECTIVE BOX
Patient is a 77y old  Male who presents with a chief complaint of bilateral lower extremity edema (14 Oct 2022 11:35)    Patient was seen and examined.  Denies chest pain , sob  lower ext edema resolving  Denies any other complaints.  All systems reviewed positive history as mentioned above.    PAST MEDICAL & SURGICAL HISTORY:  CKD (chronic kidney disease)  Hypertension  Type 2 diabetes mellitus    Allergies  No Known Allergies    MEDICATIONS  (STANDING):  amLODIPine   Tablet 5 milliGRAM(s) Oral daily  furosemide   Injectable 40 milliGRAM(s) IV Push daily  heparin   Injectable 5000 Unit(s) SubCutaneous every 8 hours  influenza  Vaccine (HIGH DOSE) 0.7 milliLiter(s) IntraMuscular once  metoprolol succinate ER 25 milliGRAM(s) Oral daily  sodium bicarbonate 650 milliGRAM(s) Oral every 8 hours  sodium zirconium cyclosilicate 10 Gram(s) Oral daily    MEDICATIONS  (PRN):    Vital Signs Last 24 Hrs  T(C): 36.4  T(F): 97.6  HR: 94  BP: 130/63  BP(mean): --  RR: 18  SpO2: 96%    O/E:  Awake, alert, not in distress.  HEENT: atraumatic, EOMI.  Chest: decreased breath sounds at bases  CVS: SIS2 +, no murmur.  P/A: obese, BS+  CNS: awake, alert  Ext:  lower ext edema decreased, lower ext chr dermatitis  All systems reviewed positive findings as above.  POCT Blood Glucose.: 124 mg/dL (14 Oct 2022 08:16)                          8.5<L>  8.22  )-----------( 287      ( 14 Oct 2022 07:26 )             26.7<L>                        8.2<L>  8.82  )-----------( 354      ( 13 Oct 2022 18:34 )             25.3<L>    10    144  |  113<H>  |  63<HH>  ----------------------------<  112<H>  5.5<H>   |  13<L>  |  3.3<H>  10    142  |  112<H>  |  67<HH>  ----------------------------<  140<H>  4.9   |  17  |  3.2<H>    Ca    8.2<L>      14 Oct 2022 07:26  Ca    7.9<L>      13 Oct 2022 18:34  Ca    8.2<L>      13 Oct 2022 12:42  Ca    8.3<L>      12 Oct 2022 15:04  Phos  4.9     10-  Mg     1.8     10-14    TPro  5.7<L>  /  Alb  2.6<L>  /  TBili  <0.2  /  DBili  x   /  AST  24  /  ALT  19  /  AlkPhos  357<H>  10-14  TPro  6.7  /  Alb  3.7  /  TBili  0.3  /  DBili  x   /  AST  33  /  ALT  28  /  AlkPhos  457<H>  10-12      CARDIAC MARKERS ( 13 Oct 2022 12:42 )  x     / 0.11 ng/mL<HH> / x     / x     / x      CARDIAC MARKERS ( 12 Oct 2022 22:45 )  x     / 0.12 ng/mL<HH> / x     / x     / x      CARDIAC MARKERS ( 12 Oct 2022 15:04 )  x     / 0.14 ng/mL<HH> / x     / x     / x            Urinalysis Basic - ( 12 Oct 2022 16:45 )    Color: Yellow / Appearance: Slightly Turbid / S.011 / pH: x  Gluc: x / Ketone: Trace  / Bili: Negative / Urobili: <2 mg/dL   Blood: x / Protein: 30 mg/dL / Nitrite: Negative   Leuk Esterase: Large / RBC: 13 /HPF /  /HPF   Sq Epi: x / Non Sq Epi: 1 /HPF / Bacteria: Many        Culture - Urine (collected 12 Oct 2022 16:45)  Source: Clean Catch Clean Catch (Midstream)  Preliminary Report (14 Oct 2022 11:57):    >100,000 CFU/ml Klebsiella pneumoniae

## 2022-10-14 NOTE — PROGRESS NOTE ADULT - SUBJECTIVE AND OBJECTIVE BOX
Nephrology Progress Note    NEVIN SHELL  MRN-[id number]  77y  Male    S:  Patient is seen and examined, events over the last 24h noted.    O:  Allergies:  No Known Allergies    Hospital Medications:   MEDICATIONS  (STANDING):  amLODIPine   Tablet 5 milliGRAM(s) Oral daily  furosemide   Injectable 40 milliGRAM(s) IV Push daily  heparin   Injectable 5000 Unit(s) SubCutaneous every 8 hours  influenza  Vaccine (HIGH DOSE) 0.7 milliLiter(s) IntraMuscular once  metoprolol succinate ER 25 milliGRAM(s) Oral daily  sodium bicarbonate 650 milliGRAM(s) Oral every 8 hours  sodium zirconium cyclosilicate 10 Gram(s) Oral daily    MEDICATIONS  (PRN):    Home Medications:      VITALS:  Daily Weight in k.9 (14 Oct 2022 10:27)  T(F): 97.6 (10-14-22 @ 05:01), Max: 98 (10-13-22 @ 15:00)  HR: 94 (10-14-22 @ 05:01)  BP: 130/63 (10-14-22 @ 05:01)  RR: 18 (10-14-22 @ 05:01)  SpO2: 96% (10-14-22 @ 07:50)  Wt(kg): --  I&O's Detail    14 Oct 2022 07:  -  14 Oct 2022 11:36  --------------------------------------------------------  IN:  Total IN: 0 mL    OUT:    Voided (mL): 210 mL  Total OUT: 210 mL    Total NET: -210 mL        I&O's Summary    14 Oct 2022 07:  -  14 Oct 2022 11:36  --------------------------------------------------------  IN: 0 mL / OUT: 210 mL / NET: -210 mL          PHYSICAL EXAM:  Gen: NAD  Resp: CTAB  Card: S1/S2  Abd: soft  Extremities:   Vascular access:       LABS:      10-14    144  |  113<H>  |  63<HH>  ----------------------------<  112<H>  5.5<H>   |  13<L>  |  3.3<H>    Ca    8.2<L>      14 Oct 2022 07:26  Phos  4.9     10-13  Mg     1.8     10-14    TPro  5.7<L>  /  Alb  2.6<L>  /  TBili  <0.2  /  DBili      /  AST  24  /  ALT  19  /  AlkPhos  357<H>  10-14    eGFR: 18 mL/min/1.73m2 (10-14-22 @ 07:26)  eGFR: 19 mL/min/1.73m2 (10-13-22 @ 18:34)    Phosphorus Level, Serum: 4.9 mg/dL (10-13-22 @ 12:42)    Intact PTH: 118 pg/mL (10-13-22 @ 18:34)                          8.5    8.22  )-----------( 287      ( 14 Oct 2022 07:26 )             26.7     Mean Cell Volume: 87.5 fL (10-14-22 @ 07:26)    Ferritin, Serum: 346 ng/mL (10-13-22 @ 12:42)      Urine Studies:  Urinalysis Basic - ( 12 Oct 2022 16:45 )    Color: Yellow / Appearance: Slightly Turbid / S.011 / pH:   Gluc:  / Ketone: Trace  / Bili: Negative / Urobili: <2 mg/dL   Blood:  / Protein: 30 mg/dL / Nitrite: Negative   Leuk Esterase: Large / RBC: 13 /HPF /  /HPF   Sq Epi:  / Non Sq Epi: 1 /HPF / Bacteria: Many        Urea Nitrogen,  Random Urine: 194 mg/dL (10-13-22 @ 18:26)    Sodium, Random Urine: 128.0 mmoL/L (10-13 @ 18:30)  Osmolality, Random Urine: 341 mos/kg (10-13 @ 18:30)    Creatinine trend:  Creatinine, Serum: 3.3 mg/dL (10-14-22 @ 07:26)  Creatinine, Serum: 3.2 mg/dL (10-13-22 @ 18:34)  Creatinine, Serum: 3.7 mg/dL (10-13-22 @ 12:42)  Creatinine, Serum: 3.3 mg/dL (10-12-22 @ 15:04)        US Kidney and Bladder:   ACC: [id number] EXAM:  US KIDNEYS AND BLADDER                          PROCEDURE DATE:  10/13/2022          INTERPRETATION:  CLINICAL INFORMATION: Renal failure.    COMPARISON: None available.    TECHNIQUE: Sonography of the kidneys and bladder.    FINDINGS:    Right kidney: 10.1 cm. No hydronephrosis or calculi.    Left kidney:  9.3 cm. Somewhat thinned parenchyma without hydronephrosis   or nephrolithiasis.    Urinary bladder: No debris or calculus.  Patient voided prior to the   study.    IMPRESSION:    Somewhat thinned left renal parenchyma, otherwise unremarkable study.        --- End of Report ---            SJ BELL MD; Attending Interventional Radiologist  This document has been electronically signed. Oct 13 2022  7:09AM (10-13-22 @ 04:25)     Nephrology Progress Note    NEVIN SHELL  MRN-[id number]  77y  Male    S:  Patient is seen and examined, events over the last 24h noted.    O:  Allergies:  No Known Allergies    Hospital Medications:   MEDICATIONS  (STANDING):  amLODIPine   Tablet 5 milliGRAM(s) Oral daily  furosemide   Injectable 40 milliGRAM(s) IV Push daily  heparin   Injectable 5000 Unit(s) SubCutaneous every 8 hours  influenza  Vaccine (HIGH DOSE) 0.7 milliLiter(s) IntraMuscular once  metoprolol succinate ER 25 milliGRAM(s) Oral daily  sodium bicarbonate 650 milliGRAM(s) Oral every 8 hours  sodium zirconium cyclosilicate 10 Gram(s) Oral daily    MEDICATIONS  (PRN):    Home Medications:      VITALS:  Daily Weight in k.9 (14 Oct 2022 10:27)  T(F): 97.6 (10-14-22 @ 05:01), Max: 98 (10-13-22 @ 15:00)  HR: 94 (10-14-22 @ 05:01)  BP: 130/63 (10-14-22 @ 05:01)  RR: 18 (10-14-22 @ 05:01)  SpO2: 96% (10-14-22 @ 07:50)  Wt(kg): --  I&O's Detail    14 Oct 2022 07:  -  14 Oct 2022 11:36  --------------------------------------------------------  IN:  Total IN: 0 mL    OUT:    Voided (mL): 210 mL  Total OUT: 210 mL    Total NET: -210 mL        I&O's Summary    14 Oct 2022 07:  -  14 Oct 2022 11:36  --------------------------------------------------------  IN: 0 mL / OUT: 210 mL / NET: -210 mL          PHYSICAL EXAM:  Gen: NAD  Resp: b/l breath sounds  Card: S1/S2  Abd: soft  Extremities: b/l LE pitting edema, L>R      LABS:      10-14    144  |  113<H>  |  63<HH>  ----------------------------<  112<H>  5.5<H>   |  13<L>  |  3.3<H>    Ca    8.2<L>      14 Oct 2022 07:26  Phos  4.9     10-13  Mg     1.8     10-14    TPro  5.7<L>  /  Alb  2.6<L>  /  TBili  <0.2  /  DBili      /  AST  24  /  ALT  19  /  AlkPhos  357<H>  10-14    eGFR: 18 mL/min/1.73m2 (10-14-22 @ 07:26)  eGFR: 19 mL/min/1.73m2 (10-13-22 @ 18:34)    Phosphorus Level, Serum: 4.9 mg/dL (10-13-22 @ 12:42)    Intact PTH: 118 pg/mL (10-13-22 @ 18:34)                          8.5    8.22  )-----------( 287      ( 14 Oct 2022 07:26 )             26.7     Mean Cell Volume: 87.5 fL (10-14-22 @ 07:26)    Ferritin, Serum: 346 ng/mL (10-13-22 @ 12:42)      Urine Studies:  Urinalysis Basic - ( 12 Oct 2022 16:45 )    Color: Yellow / Appearance: Slightly Turbid / S.011 / pH:   Gluc:  / Ketone: Trace  / Bili: Negative / Urobili: <2 mg/dL   Blood:  / Protein: 30 mg/dL / Nitrite: Negative   Leuk Esterase: Large / RBC: 13 /HPF /  /HPF   Sq Epi:  / Non Sq Epi: 1 /HPF / Bacteria: Many        Urea Nitrogen,  Random Urine: 194 mg/dL (10-13-22 @ 18:26)    Sodium, Random Urine: 128.0 mmoL/L (10-13 @ 18:30)  Osmolality, Random Urine: 341 mos/kg (10-13 @ 18:30)    Creatinine trend:  Creatinine, Serum: 3.3 mg/dL (10-14-22 @ 07:26)  Creatinine, Serum: 3.2 mg/dL (10-13-22 @ 18:34)  Creatinine, Serum: 3.7 mg/dL (10-13-22 @ 12:42)  Creatinine, Serum: 3.3 mg/dL (10-12-22 @ 15:04)        US Kidney and Bladder:   ACC: [id number] EXAM:  US KIDNEYS AND BLADDER                          PROCEDURE DATE:  10/13/2022          INTERPRETATION:  CLINICAL INFORMATION: Renal failure.    COMPARISON: None available.    TECHNIQUE: Sonography of the kidneys and bladder.    FINDINGS:    Right kidney: 10.1 cm. No hydronephrosis or calculi.    Left kidney:  9.3 cm. Somewhat thinned parenchyma without hydronephrosis   or nephrolithiasis.    Urinary bladder: No debris or calculus.  Patient voided prior to the   study.    IMPRESSION:    Somewhat thinned left renal parenchyma, otherwise unremarkable study.    < from: TTE Echo Complete w/o Contrast w/ Doppler (10.13.22 @ 07:06) >  Summary:   1. Left ventricular ejection fraction, byvisual estimation, is 70 to   75%.   2. Hyperdynamic global left ventricular systolic function.   3. Mildly increased LV wall thickness.   4. Mild mitral valve regurgitation.   5. Mild thickening and calcification of the anterior and posterior   mitral valve leaflets.   6. Moderate mitral annular calcification.   7. Dilatation of the ascending aorta at 4.2 cm.   8. Sclerotic aortic valve with normal opening.   9. Mild pulmonic valve regurgitation.  10. Spectral Doppler shows impaired relaxation pattern of left   ventricular myocardial filling (Grade I diastolic dysfunction).    < end of copied text >    < from: CT Chest No Cont (10.13.22 @ 09:34) >    ACC: [id number] EXAM:  CT CHEST                          PROCEDURE DATE:  10/13/2022          INTERPRETATION:  CLINICAL INDICATION: Abnormal x-ray with left lung   opacities.    TECHNIQUE:  A noncontrast CT of the thorax was performed. Sagittal and   coronal reformats were performed as well as MIP reconstructions.    COMPARISON: None. Nose made of chest x-ray dated 10/12/2022    INTERPRETATION:    AIRWAYS, LUNGS AND PLEURA: There is a triangular/wedge-shaped opacity in   the left upper lobe with a few peripheral calcifications, with limited   ability to differentiate pneumonia versus atelectasis versus mass. Trace   left pleural effusion. Bilateral predominantly linear and dependent   opacities, likely on the basis of atelectasis, again superimposed   infection not excluded particularly at the left lung base. Central   airways are patent. No pneumothorax    MEDIASTINUM: No enlarged thoracic lymph nodes    HEART AND VESSELS: Heart size appears within normal limits. No   pericardial effusion. Normal caliber thoracic aorta. Coronary artery and   aortic calcifications.    UPPER ABDOMEN: Partially imaged, nonspecific fluid or fat inflammation in   the anterior right upper quadrant (4/186)    BONES AND SOFT TISSUES: Asymmetric gynecomastia,greater on the left.   Displaced fracture of the left posterior lateral sixth rib with minimal   if any callus formation. Displaced fracture of the left posterior lateral   ninth rib with mild callus formation. Additional bilateral chronic   appearing rib fractures.    IMPRESSION:    Triangular/wedge-shaped opacity in the left upper lobe, likely   corresponding to abnormality on recent chest radiograph which is   nonspecific and of unclear etiology. Differential is broad and includes   pneumonia versus atelectasis/scarring or loculated fluid. Follow-up   posttreatment is needed to ensure no underlying mass.    Trace left pleural effusion and additional basilar/dependent opacities   likely on the basis of atelectasis.    Two left-sided rib fractures demonstrate minimal callus formation,   possibly subacute fractures. No pneumothorax.    Partially imaged, nonspecific fluid or fat inflammation in the anterior   right upper quadrant. Recommend CT abdomen for further evaluation.    --- End of Report ---          REKHA GOMEZ MD; Resident Radiologist  This document has been electronically signed.  CHANI BABIN MD; Attending Radiologist  This document has been electronically signed. Oct 13 2022  3:26PM    < end of copied text >

## 2022-10-14 NOTE — PHYSICAL THERAPY INITIAL EVALUATION ADULT - GENERAL OBSERVATIONS, REHAB EVAL
Pt encountered in semi-flores position in bed A & O x 4 in NAD, no c/o pain and agreeable with PT. Pt performed bed mobility with Min A, sit/stand transfer Mod A, bed>chair Min A and ambulated 40 ft Min A using RW. Pt demonstrate limited mobility secondary weakness and impaired balance. Pt left seated in chair in NAD, +chair alarm, call belll, RN aware.

## 2022-10-14 NOTE — DISCHARGE NOTE PROVIDER - NSDCCPCAREPLAN_GEN_ALL_CORE_FT
PRINCIPAL DISCHARGE DIAGNOSIS  Diagnosis: Bilateral leg edema  Assessment and Plan of Treatment: you presented with leg swelling likely caused by your kidney disease. you were treated with lasix (water medication) and your edema imporved. Please follow up with ollie doctor (nephrology) and take your medication as prescribed to avoid recurrence      SECONDARY DISCHARGE DIAGNOSES  Diagnosis: Diabetes mellitus  Assessment and Plan of Treatment:     Diagnosis: Acute on chronic renal failure  Assessment and Plan of Treatment:     Diagnosis: Elevated troponin  Assessment and Plan of Treatment:     Diagnosis: Anemia  Assessment and Plan of Treatment:      PRINCIPAL DISCHARGE DIAGNOSIS  Diagnosis: Bilateral leg edema  Assessment and Plan of Treatment: you presented with leg swelling likely caused by your kidney disease. you were treated with lasix (water medication) and your edema imporved. Please follow up with ollie doctor (nephrology) and take your medication as prescribed to avoid recurrence

## 2022-10-14 NOTE — DISCHARGE NOTE PROVIDER - PROVIDER TOKENS
PROVIDER:[TOKEN:[[id number]],FOLLOWUP:[2 weeks]] PROVIDER:[TOKEN:[[id number]],FOLLOWUP:[2 weeks]],PROVIDER:[TOKEN:[[id number]],FOLLOWUP:[1 month]],PROVIDER:[TOKEN:[[id number]],FOLLOWUP:[2 months]],PROVIDER:[TOKEN:[[id number]],FOLLOWUP:[1 week]]

## 2022-10-14 NOTE — PROGRESS NOTE ADULT - ASSESSMENT
77 year old male with a history of DM poorly controlled and HTN presents to the ED with bilateral lower extremity edema.     # Acute on Chronic  diastolic CHF -resolved  # Elevated troponin sec to ckd, CHF  -  TTE Echo Complete w/o Contrast w/ Doppler (10.13.22 @ 07:06) >Left ventricular ejection fraction, byvisual estimation, is 70 to 75%.Dilatation of the ascending aorta at 4.2 cm. Grade I diastolic dysfunction  - Troponin T, Serum: 0.11: Critical value: ng/mL (10.13.22 @ 12:42)  - monitor I/o, daily weight, restrict fluids  - Switch to PO lasix    # CKD stage4  # Metabolic acidosis  # Normocytic anemia sec to ckd  -  US Kidney and Bladder (10.13.22 @ 04:25) >Somewhat thinned left renal parenchyma, otherwise unremarkable study.  - F/u iron profile, ferritin  - c/w sodium bicarb  - nephrology following    # Chronic venous stasis b/l LE  - VA Duplex Lower Ext Vein Scan, Bilat (10.12.22 @ 15:56) >No evidence of deep venous thrombosis or superficial thrombophlebitis in the bilateral lower extremities. Unable to visualize calf veins due to edema.  - Burn eval: no open wounds, no surgical intervention warranted at this time  - continue local wound care: wash with soap and water. hydrogel and allovyn to sacrum BID.     # Left basilar opacity on imaging  -  CT Chest No Cont (10.13.22 @ 09:34) >Triangular/wedge-shaped opacity in the left upper lobe, likely corresponding to abnormality on recent chest radiograph which is nonspecific and of unclear etiology. Differential is broad and includes pneumonia versus atelectasis/scarring or loculated fluid. Follow-up posttreatment is needed to ensure no underlying mass.  - pulm eval: This could represent atelectasis vs consolidation in the right cinical setting.   - He has no fevers, no leukocytosis, no purulent phlegm/cough. Check procalcitonin level. If negative then no need for abx. Given smoking history it would be reasonable to repeat CT chest in 6 weeks to document resolution/stability.   - Procalcitonin, Serum: 0.78:(10.13.22 @ 18:34)  - oxygen sat 96 on RA     # Hypertension  - c/w norvasc, lasix    # DM type2  - A1C with Estimated Average Glucose Result: 5.9 % (10.14.22 @ 07:26)  - monitor FS    # Asymptomatic pyuria    # TAA  - Echo 10/13: 4.2cm dilatation of ascending aorta  - outpt F/u with vascular    # DVT prophylaxis    # Full code    # Pending: F/u iron profile, ferritin, PT eval  # Discussed plan of care with patient  # Disposition: Home when stable 77 year old male with a history of DM poorly controlled and HTN presents to the ED with bilateral lower extremity edema.     # Acute on Chronic  diastolic CHF -resolved  # Elevated troponin sec to ckd, CHF  -  TTE Echo Complete w/o Contrast w/ Doppler (10.13.22 @ 07:06) >Left ventricular ejection fraction, byvisual estimation, is 70 to 75%.Dilatation of the ascending aorta at 4.2 cm. Grade I diastolic dysfunction  - Troponin T, Serum: 0.11: Critical value: ng/mL (10.13.22 @ 12:42)  - monitor I/o, daily weight, restrict fluids  - Switch to PO lasix    # CKD stage4  # Metabolic acidosis  # Normocytic anemia sec to ckd  # Hyperkalemia- hemolysed  -  US Kidney and Bladder (10.13.22 @ 04:25) >Somewhat thinned left renal parenchyma, otherwise unremarkable study.  - F/u iron profile, ferritin  - increase sodium bicarb  - nephrology following    # Chronic venous stasis b/l LE  - VA Duplex Lower Ext Vein Scan, Bilat (10.12.22 @ 15:56) >No evidence of deep venous thrombosis or superficial thrombophlebitis in the bilateral lower extremities. Unable to visualize calf veins due to edema.  - Burn eval: no open wounds, no surgical intervention warranted at this time  - continue local wound care: wash with soap and water. hydrogel and allovyn to sacrum BID.     # Left basilar opacity on imaging  -  CT Chest No Cont (10.13.22 @ 09:34) >Triangular/wedge-shaped opacity in the left upper lobe, likely corresponding to abnormality on recent chest radiograph which is nonspecific and of unclear etiology. Differential is broad and includes pneumonia versus atelectasis/scarring or loculated fluid. Follow-up posttreatment is needed to ensure no underlying mass.  - pulm eval: This could represent atelectasis vs consolidation in the right cinical setting.   - He has no fevers, no leukocytosis, no purulent phlegm/cough. Check procalcitonin level. If negative then no need for abx. Given smoking history it would be reasonable to repeat CT chest in 6 weeks to document resolution/stability.   - Procalcitonin, Serum: 0.78:(10.13.22 @ 18:34)  - oxygen sat 96 on RA     # Hypertension  - c/w norvasc, lasix    # DM type2  - A1C with Estimated Average Glucose Result: 5.9 % (10.14.22 @ 07:26)  - monitor FS    # Asymptomatic pyuria    # TAA  - Echo 10/13: 4.2cm dilatation of ascending aorta  - outpt F/u with vascular    # DVT prophylaxis    # Full code    # Pending: F/u iron profile, ferritin, monitor bmp,  PT eval  # Discussed plan of care with patient  # Disposition: Home when stable

## 2022-10-14 NOTE — DISCHARGE NOTE PROVIDER - CARE PROVIDERS DIRECT ADDRESSES
,DirectAddress_Unknown [id number] Exp Problem Focused - Low Complex ,DirectAddress_Unknown,DirectAddress_Unknown,DirectAddress_Unknown,DirectAddress_Unknown

## 2022-10-14 NOTE — PHYSICAL THERAPY INITIAL EVALUATION ADULT - PERTINENT HX OF CURRENT PROBLEM, REHAB EVAL
77 year old male with a history of DM and HTN presents to the ED with bilateral lower extremity edema.

## 2022-10-14 NOTE — DISCHARGE NOTE PROVIDER - NSDCMRMEDTOKEN_GEN_ALL_CORE_FT
amLODIPine 5 mg oral tablet: 1 tab(s) orally once a day  furosemide 40 mg oral tablet: 1 tab(s) orally once a day  iron sucrose 20 mg/mL intravenous solution: 10 milliliter(s) intravenous every 24 hours  lactulose 10 g/15 mL oral syrup: 30 milliliter(s) orally 2 times a day  metoprolol succinate 25 mg oral tablet, extended release: 1 tab(s) orally once a day  polyethylene glycol 3350 oral powder for reconstitution: 17 gram(s) orally 2 times a day  senna leaf extract oral tablet: 2 tab(s) orally once a day (at bedtime)  sodium bicarbonate 650 mg oral tablet: 2 tab(s) orally 3 times a day

## 2022-10-14 NOTE — SWALLOW BEDSIDE ASSESSMENT ADULT - SLP PERTINENT HISTORY OF CURRENT PROBLEM
pt is a 76 y/o M w/ PMHx: DM (poorly controlled) and HTN presents to the ED w/ B/L LE edema. Pt is being treated for acute on chronic diastolic CHF, elevated troponin 2' CKD, metabolic acidosis, normocytic anemia, hyperkalemia.

## 2022-10-14 NOTE — CONSULT NOTE ADULT - ASSESSMENT
IMPRESSION: Rehab of deconditioning / Nilson LE edema    PRECAUTIONS: [   ] Cardiac  [   ] Respiratory  [   ] Seizures [   ] Contact Isolation  [   ] Droplet Isolation  [   ] Other    Weight Bearing Status:     RECOMMENDATION:    Out of Bed to Chair     DVT/Decubiti Prophylaxis    REHAB PLAN:     [   x ] Bedside P/T 3-5 times a week   [    ]   Bedside O/T  2-3 times a week             [    ] Speech Therapy               [    ]  No Rehab Therapy Indicated   Conditioning/ROM                                    ADL  Bed Mobility                                               Conditioning/ROM  Transfers                                                     Bed Mobility  Sitting /Standing Balance                         Transfers                                        Gait Training                                               Sitting/Standing Balance  Stair Training [   ]Applicable                    Home equipment Eval                                                                        Splinting  [   ] Only      GOALS:   ADL   [    ]   Independent                    Transfers  [  x  ] Independent                          Ambulation  [   x ] Independent     [    x ] With device                            [    ]  CG                                                         [    ]  CG                                                                  [    ] CG                            [    ] Min A                                                   [    ] Min A                                                              [    ] Min  A          DISCHARGE PLAN:   [    ]  Good candidate for Intensive Rehabilitation/Hospital based                                             Will tolerate 3hrs Intensive Rehab Daily                                       [  x   ]  Short Term Rehab in Skilled Nursing Facility                                       [     ]  Home with Outpatient or  services                                         [     ]  Possible Candidate for Intensive Hospital based Rehab

## 2022-10-14 NOTE — DISCHARGE NOTE PROVIDER - HOSPITAL COURSE
HPI:  77 year old male with a history of DM poorly controlled and HTN presents to the ED with bilateral lower extremity edema. Patient reports that his legs have been swollen for months. In the past few weeks swelling has worsened and his family who is at bedside noted that patient has limited capability to ambulate despite use of a walker. He has been spending most of his time sitting and/or laying down and has developed decubitus ulcer on his buttock and proximal left leg. Also notes ulcerations of his right shin with clear liquid drainage. Admits to cramping in the legs and weakness bilaterally. No erythema of the legs, numbness/tingling, bruising. Admits to chronic dyspnea on exertion and urinary frequency. Further admits to excessive loose stools and diarrhea over the past few weeks, no blood. Denies fever/chills, chest pain, abdominal pain, nausea/vomiting, constipation.  Patient has not seen a doctor since before the pandemic. His current medication list is  from 2020.     ED course : /66, , RR 18, T 98.7, Spo2 98 on RA . Labs significant for hgb 8.9, K 5.4, Hco3 15, AG 17, BUN 74, Cr 3.3, trop 0.14, BNP 1174. CXR notable for Patchy left basilar opacities, noting the dominant opacity is somewhat ovoid measuring up to 2.6 cm. B/l Lower extremity duplex showed no evidence of deep venous thrombosis or superficial thrombophlebitis in the bilateral lower extremities.       (12 Oct 2022 20:41)    Patient was admitted for treatment of LLE edema likely 2/2 advanced CKD. US kidneys showed thinning of the left kidney parenchyma. LE venous duplex showed no evidence of DVT.  TTE showed mild LVH with hyperdynamic LV LVEF 70%, grade I diastolic dysfunction, Normal RV size and function and no significant valve disease.   Paitent improved with diuretic therapy and is medically stable and ready for discharge.   HPI:  77 year old male with a history of DM poorly controlled and HTN presents to the ED with bilateral lower extremity edema. Patient reports that his legs have been swollen for months. In the past few weeks swelling has worsened and his family who is at bedside noted that patient has limited capability to ambulate despite use of a walker. He has been spending most of his time sitting and/or laying down and has developed decubitus ulcer on his buttock and proximal left leg. Also notes ulcerations of his right shin with clear liquid drainage. Admits to cramping in the legs and weakness bilaterally. No erythema of the legs, numbness/tingling, bruising. Admits to chronic dyspnea on exertion and urinary frequency. Further admits to excessive loose stools and diarrhea over the past few weeks, no blood. Denies fever/chills, chest pain, abdominal pain, nausea/vomiting, constipation.  Patient has not seen a doctor since before the pandemic. His current medication list is  from 2020.     ED course : /66, , RR 18, T 98.7, Spo2 98 on RA . Labs significant for hgb 8.9, K 5.4, Hco3 15, AG 17, BUN 74, Cr 3.3, trop 0.14, BNP 1174. CXR notable for Patchy left basilar opacities, noting the dominant opacity is somewhat ovoid measuring up to 2.6 cm. B/l Lower extremity duplex showed no evidence of deep venous thrombosis or superficial thrombophlebitis in the bilateral lower extremities.      Patient was admitted for treatment of LLE edema likely 2/2 advanced CKD with HFpEF. US kidneys showed thinning of the left kidney parenchyma. Nephrology was following, started on bicarb and lasix. LE venous duplex showed no evidence of DVT. TTE showed mild LVH with hyperdynamic LV LVEF 70%, grade I diastolic dysfunction, Normal RV size and function and no significant valve disease.   Paitent improved with diuretic therapy and is medically stable and ready for discharge.   His potassium on upper side, reaching 5.4, treated with lokelma, given laxatives before continuing lokelma due to constipation.    CT chest showed basilar opacity, evaluated by pulmonary, their impression is atelectasis, and need repeat of CT in 6 weeks for follow up.     The patient had dystrophic nails with onychomycosis, podiatry did debridement and curettage.

## 2022-10-15 LAB
-  AMIKACIN: SIGNIFICANT CHANGE UP
-  AMOXICILLIN/CLAVULANIC ACID: SIGNIFICANT CHANGE UP
-  AMPICILLIN/SULBACTAM: SIGNIFICANT CHANGE UP
-  AMPICILLIN: SIGNIFICANT CHANGE UP
-  AZTREONAM: SIGNIFICANT CHANGE UP
-  CEFAZOLIN: SIGNIFICANT CHANGE UP
-  CEFEPIME: SIGNIFICANT CHANGE UP
-  CEFOXITIN: SIGNIFICANT CHANGE UP
-  CEFTRIAXONE: SIGNIFICANT CHANGE UP
-  CIPROFLOXACIN: SIGNIFICANT CHANGE UP
-  ERTAPENEM: SIGNIFICANT CHANGE UP
-  GENTAMICIN: SIGNIFICANT CHANGE UP
-  IMIPENEM: SIGNIFICANT CHANGE UP
-  LEVOFLOXACIN: SIGNIFICANT CHANGE UP
-  MEROPENEM: SIGNIFICANT CHANGE UP
-  NITROFURANTOIN: SIGNIFICANT CHANGE UP
-  PIPERACILLIN/TAZOBACTAM: SIGNIFICANT CHANGE UP
-  TIGECYCLINE: SIGNIFICANT CHANGE UP
-  TOBRAMYCIN: SIGNIFICANT CHANGE UP
-  TRIMETHOPRIM/SULFAMETHOXAZOLE: SIGNIFICANT CHANGE UP
ANION GAP SERPL CALC-SCNC: 12 MMOL/L — SIGNIFICANT CHANGE UP (ref 7–14)
ANION GAP SERPL CALC-SCNC: 15 MMOL/L — HIGH (ref 7–14)
BUN SERPL-MCNC: 55 MG/DL — HIGH (ref 10–20)
BUN SERPL-MCNC: 61 MG/DL — CRITICAL HIGH (ref 10–20)
CALCIUM SERPL-MCNC: 8.1 MG/DL — LOW (ref 8.4–10.5)
CALCIUM SERPL-MCNC: 8.2 MG/DL — LOW (ref 8.4–10.4)
CHLORIDE SERPL-SCNC: 106 MMOL/L — SIGNIFICANT CHANGE UP (ref 98–110)
CHLORIDE SERPL-SCNC: 110 MMOL/L — SIGNIFICANT CHANGE UP (ref 98–110)
CO2 SERPL-SCNC: 19 MMOL/L — SIGNIFICANT CHANGE UP (ref 17–32)
CO2 SERPL-SCNC: 20 MMOL/L — SIGNIFICANT CHANGE UP (ref 17–32)
CREAT SERPL-MCNC: 3.1 MG/DL — HIGH (ref 0.7–1.5)
CREAT SERPL-MCNC: 3.1 MG/DL — HIGH (ref 0.7–1.5)
CULTURE RESULTS: SIGNIFICANT CHANGE UP
EGFR: 20 ML/MIN/1.73M2 — LOW
EGFR: 20 ML/MIN/1.73M2 — LOW
FERRITIN SERPL-MCNC: 328 NG/ML — SIGNIFICANT CHANGE UP (ref 30–400)
GLUCOSE SERPL-MCNC: 120 MG/DL — HIGH (ref 70–99)
GLUCOSE SERPL-MCNC: 92 MG/DL — SIGNIFICANT CHANGE UP (ref 70–99)
IRON SATN MFR SERPL: 20 % — SIGNIFICANT CHANGE UP (ref 15–50)
IRON SATN MFR SERPL: 29 UG/DL — LOW (ref 35–150)
METHOD TYPE: SIGNIFICANT CHANGE UP
ORGANISM # SPEC MICROSCOPIC CNT: SIGNIFICANT CHANGE UP
ORGANISM # SPEC MICROSCOPIC CNT: SIGNIFICANT CHANGE UP
POTASSIUM SERPL-MCNC: 5.3 MMOL/L — HIGH (ref 3.5–5)
POTASSIUM SERPL-MCNC: 5.8 MMOL/L — HIGH (ref 3.5–5)
POTASSIUM SERPL-SCNC: 5.3 MMOL/L — HIGH (ref 3.5–5)
POTASSIUM SERPL-SCNC: 5.8 MMOL/L — HIGH (ref 3.5–5)
PROT SERPL-MCNC: 5.7 G/DL — LOW (ref 6–8.3)
PROT SERPL-MCNC: 5.7 G/DL — LOW (ref 6–8.3)
SODIUM SERPL-SCNC: 140 MMOL/L — SIGNIFICANT CHANGE UP (ref 135–146)
SODIUM SERPL-SCNC: 142 MMOL/L — SIGNIFICANT CHANGE UP (ref 135–146)
SPECIMEN SOURCE: SIGNIFICANT CHANGE UP
TIBC SERPL-MCNC: 146 UG/DL — LOW (ref 220–430)
TRANSFERRIN SERPL-MCNC: 124 MG/DL — LOW (ref 200–360)
UIBC SERPL-MCNC: 117 UG/DL — SIGNIFICANT CHANGE UP (ref 110–370)

## 2022-10-15 PROCEDURE — 99233 SBSQ HOSP IP/OBS HIGH 50: CPT

## 2022-10-15 RX ORDER — SENNA PLUS 8.6 MG/1
2 TABLET ORAL AT BEDTIME
Refills: 0 | Status: DISCONTINUED | OUTPATIENT
Start: 2022-10-15 | End: 2022-10-17

## 2022-10-15 RX ORDER — SODIUM ZIRCONIUM CYCLOSILICATE 10 G/10G
10 POWDER, FOR SUSPENSION ORAL THREE TIMES A DAY
Refills: 0 | Status: COMPLETED | OUTPATIENT
Start: 2022-10-15 | End: 2022-10-16

## 2022-10-15 RX ORDER — POLYETHYLENE GLYCOL 3350 17 G/17G
17 POWDER, FOR SOLUTION ORAL DAILY
Refills: 0 | Status: DISCONTINUED | OUTPATIENT
Start: 2022-10-15 | End: 2022-10-17

## 2022-10-15 RX ADMIN — Medication 1300 MILLIGRAM(S): at 05:34

## 2022-10-15 RX ADMIN — Medication 40 MILLIGRAM(S): at 05:36

## 2022-10-15 RX ADMIN — SENNA PLUS 2 TABLET(S): 8.6 TABLET ORAL at 21:42

## 2022-10-15 RX ADMIN — AMLODIPINE BESYLATE 5 MILLIGRAM(S): 2.5 TABLET ORAL at 05:35

## 2022-10-15 RX ADMIN — Medication 1300 MILLIGRAM(S): at 14:00

## 2022-10-15 RX ADMIN — HEPARIN SODIUM 5000 UNIT(S): 5000 INJECTION INTRAVENOUS; SUBCUTANEOUS at 14:00

## 2022-10-15 RX ADMIN — POLYETHYLENE GLYCOL 3350 17 GRAM(S): 17 POWDER, FOR SOLUTION ORAL at 12:00

## 2022-10-15 RX ADMIN — SODIUM ZIRCONIUM CYCLOSILICATE 10 GRAM(S): 10 POWDER, FOR SUSPENSION ORAL at 21:42

## 2022-10-15 RX ADMIN — Medication 1300 MILLIGRAM(S): at 21:41

## 2022-10-15 RX ADMIN — HEPARIN SODIUM 5000 UNIT(S): 5000 INJECTION INTRAVENOUS; SUBCUTANEOUS at 05:36

## 2022-10-15 RX ADMIN — HEPARIN SODIUM 5000 UNIT(S): 5000 INJECTION INTRAVENOUS; SUBCUTANEOUS at 21:40

## 2022-10-15 RX ADMIN — Medication 25 MILLIGRAM(S): at 05:34

## 2022-10-15 NOTE — PROGRESS NOTE ADULT - SUBJECTIVE AND OBJECTIVE BOX
seen and examined  no distress   lying comfortable         PAST HISTORY  --------------------------------------------------------------------------------  No significant changes to PMH, PSH, FHx, SHx, unless otherwise noted    ALLERGIES & MEDICATIONS  --------------------------------------------------------------------------------  Allergies    No Known Allergies    Intolerances      Standing Inpatient Medications  amLODIPine   Tablet 5 milliGRAM(s) Oral daily  furosemide    Tablet 40 milliGRAM(s) Oral daily  heparin   Injectable 5000 Unit(s) SubCutaneous every 8 hours  influenza  Vaccine (HIGH DOSE) 0.7 milliLiter(s) IntraMuscular once  metoprolol succinate ER 25 milliGRAM(s) Oral daily  sodium bicarbonate 1300 milliGRAM(s) Oral three times a day          VITALS/PHYSICAL EXAM  --------------------------------------------------------------------------------  T(C): 36.1 (10-15-22 @ 04:30), Max: 36.1 (10-15-22 @ 04:30)  HR: 84 (10-15-22 @ 04:30) (84 - 105)  BP: 129/59 (10-15-22 @ 04:30) (106/59 - 132/59)  RR: 18 (10-15-22 @ 04:30) (18 - 18)  SpO2: --  Wt(kg): --        10-14-22 @ 07:01  -  10-15-22 @ 07:00  --------------------------------------------------------  IN: 0 mL / OUT: 210 mL / NET: -210 mL    10-15-22 @ 07:01  -  10-15-22 @ 08:09  --------------------------------------------------------  IN: 0 mL / OUT: 300 mL / NET: -300 mL      Physical Exam:  	Gen: NAD,   	Pulm: CTA B/L  	CV:  S1S2; no rub  	Abd: soft, nontender/nondistended  	LE: no edema  	    LABS/STUDIES  --------------------------------------------------------------------------------              8.5    8.22  >-----------<  287      [10-14-22 @ 07:26]              26.7     142  |  110  |  61  ----------------------------<  120      [10-14-22 @ 22:16]  5.3   |  20  |  3.1        Ca     8.1     [10-14-22 @ 22:16]      Mg     1.8     [10-14-22 @ 07:26]      Phos  4.9     [10-13-22 @ 12:42]    TPro  5.7  /  Alb  2.6  /  TBili  <0.2  /  DBili  x   /  AST  24  /  ALT  19  /  AlkPhos  357  [10-14-22 @ 07:26]      Troponin 0.11      [10-13-22 @ 12:42]    Creatinine Trend:  SCr 3.1 [10-14 @ 22:16]  SCr 3.3 [10-14 @ 07:26]  SCr 3.2 [10-13 @ 18:34]  SCr 3.7 [10-13 @ 12:42]  SCr 3.3 [10-12 @ 15:04]    Urinalysis - [10-12-22 @ 16:45]      Color Yellow / Appearance Slightly Turbid / SG 1.011 / pH 6.0      Gluc Negative / Ketone Trace  / Bili Negative / Urobili <2 mg/dL       Blood Small / Protein 30 mg/dL / Leuk Est Large / Nitrite Negative      RBC 13 /  / Hyaline 2 / Gran  / Sq Epi  / Non Sq Epi 1 / Bacteria Many    Urine Creatinine 18      [10-13-22 @ 18:26]  Urine Protein 11      [10-13-22 @ 18:26]  Urine Sodium 128.0      [10-13-22 @ 18:30]  Urine Urea Nitrogen 194      [10-13-22 @ 18:26]  Urine Potassium 8      [10-13-22 @ 18:30]  Urine Osmolality 341      [10-13-22 @ 18:30]    Ferritin 346      [10-13-22 @ 12:42]  PTH -- (Ca 8.3)      [10-13-22 @ 18:34]   118  Lipid: chol 128, , HDL 49, LDL --      [10-13-22 @ 12:42]

## 2022-10-15 NOTE — PROGRESS NOTE ADULT - ASSESSMENT
77 year old male with a history of DM and HTN presents to the ED with bilateral lower extremity edema.    #Acute on Chronic HFpEF  #Chronic venous stasis b/l LE  B/l Lower extremity duplex showed no evidence of deep venous thrombosis or superficial thrombophlebitis in the bilateral lower extremities.  Echo 10/13: 70-75%EF, Grade 1 diastolic dysfunction   - s/p Lasix   - Burn eval for wound care  - PT Eval    #Left basilar opacity  - f/u CT Chest results - pneumonia versus atelectasis/scarring or loculated fluid. Follow-up posttreatment is needed to ensure no underlying mass    - May need STR based on functional status    #JONH on CKD Stage 4  Pt endorses his creatinine has always been elevated, unsure of number but believes high 2 to 3s  - Hold off on further lasix dose  - Cont sodium bicarb 650 q8h  - Nephro f/u    #TAA  Echo 10/13: 4.2cm dilatation of ascending aorta  - Will need outpatient f/u    #DM2  - Start ISS if needed    #HTN  - Cont amlodipine 5mg qD    #Constipation - bowel regemin     DVT PPX, heparin    #Progress Note Handoff  Pending (specify): podiatry consult, constipation   Family discussion: d/w pt regarding plan of care   Disposition: STR

## 2022-10-15 NOTE — PROGRESS NOTE ADULT - SUBJECTIVE AND OBJECTIVE BOX
Pt seen and examined at bedside. Patient reports constipation.             VITAL SIGNS (Last 24 hrs):  T(C): 35.8 (10-15-22 @ 12:46), Max: 36.1 (10-15-22 @ 04:30)  HR: 83 (10-15-22 @ 12:46) (83 - 105)  BP: 115/56 (10-15-22 @ 12:46) (106/59 - 132/59)  RR: 19 (10-15-22 @ 12:46) (18 - 19)       I&O's Summary    14 Oct 2022 07:01  -  15 Oct 2022 07:00  --------------------------------------------------------  IN: 0 mL / OUT: 210 mL / NET: -210 mL    15 Oct 2022 07:01  -  15 Oct 2022 12:56  --------------------------------------------------------  IN: 0 mL / OUT: 700 mL / NET: -700 mL        PHYSICAL EXAM:  GENERAL: NAD, well-developed  HEAD:  Atraumatic, Normocephalic  EYES: EOMI, PERRLA, conjunctiva and sclera clear  NECK: Supple, No JVD  CHEST/LUNG: Clear to auscultation bilaterally; No wheeze  HEART: Regular rate and rhythm; No murmurs, rubs, or gallops  ABDOMEN: Soft, Nontender, Nondistended; Bowel sounds present  EXTREMITIES:  2+ Peripheral Pulses, No clubbing, cyanosis, or edema  PSYCH: AAOx3  NEUROLOGY: non-focal  SKIN: No rashes or lesions    Labs Reviewed  Spoke to patient in regards to abnormal labs.    CBC Full  -  ( 14 Oct 2022 07:26 )  WBC Count : 8.22 K/uL  Hemoglobin : 8.5 g/dL  Hematocrit : 26.7 %  Platelet Count - Automated : 287 K/uL  Mean Cell Volume : 87.5 fL  Mean Cell Hemoglobin : 27.9 pg  Mean Cell Hemoglobin Concentration : 31.8 g/dL  Auto Neutrophil # : x  Auto Lymphocyte # : x  Auto Monocyte # : x  Auto Eosinophil # : x  Auto Basophil # : x  Auto Neutrophil % : x  Auto Lymphocyte % : x  Auto Monocyte % : x  Auto Eosinophil % : x  Auto Basophil % : x    BMP:    10-14 @ 22:16    Blood Urea Nitrogen - 61  Calcium - 8.1  Carbon  Dioxide - 20  Chloride - 110  Creatinine - 3.1  Glucose - 120  Potassium - 5.3  Sodium - 142           Urine Culture:  10-12 @ 16:45 Urine culture: --    Culture Results:   >100,000 CFU/ml Klebsiella pneumoniae  Method Type: CARITO  Organism: Klebsiella pneumoniae  Organism Identification: Klebsiella pneumoniae  Specimen Source: Clean Catch Clean Catch (Midstream)         MEDICATIONS  (STANDING):  amLODIPine   Tablet 5 milliGRAM(s) Oral daily  furosemide    Tablet 40 milliGRAM(s) Oral daily  heparin   Injectable 5000 Unit(s) SubCutaneous every 8 hours  influenza  Vaccine (HIGH DOSE) 0.7 milliLiter(s) IntraMuscular once  metoprolol succinate ER 25 milliGRAM(s) Oral daily  polyethylene glycol 3350 17 Gram(s) Oral daily  senna 2 Tablet(s) Oral at bedtime  sodium bicarbonate 1300 milliGRAM(s) Oral three times a day    MEDICATIONS  (PRN):

## 2022-10-15 NOTE — PROGRESS NOTE ADULT - ASSESSMENT
Pt with DM, HTN, CKD 4, presents with increased LE edema and exertional dyspnes.  CKD 4 - near baseline of creat ~3 per pt / follows with Dr. Lozoya  Kidney and bladders sono - no hydro, thin left cortex  UA proteinuria 30, etiology likely HTN, DM / urine pr/cr ratio 0.6g/g  check serum flc ratio, spep/sife  cr stable   if bp remains on the low side, d/a amlodipine   phos level ok, no need for phos binder  repeat k level   continue  sodium bicarb  1300mg q8h  Anemia - check iron stores please, will need Venofer and DANIELLA most likely  will follow

## 2022-10-16 LAB
ALBUMIN SERPL ELPH-MCNC: 2.8 G/DL — LOW (ref 3.5–5.2)
ALP SERPL-CCNC: 312 U/L — HIGH (ref 30–115)
ALT FLD-CCNC: 19 U/L — SIGNIFICANT CHANGE UP (ref 0–41)
ANION GAP SERPL CALC-SCNC: 12 MMOL/L — SIGNIFICANT CHANGE UP (ref 7–14)
AST SERPL-CCNC: 21 U/L — SIGNIFICANT CHANGE UP (ref 0–41)
BILIRUB SERPL-MCNC: 0.2 MG/DL — SIGNIFICANT CHANGE UP (ref 0.2–1.2)
BUN SERPL-MCNC: 52 MG/DL — HIGH (ref 10–20)
CALCIUM SERPL-MCNC: 8.3 MG/DL — LOW (ref 8.4–10.5)
CHLORIDE SERPL-SCNC: 109 MMOL/L — SIGNIFICANT CHANGE UP (ref 98–110)
CO2 SERPL-SCNC: 22 MMOL/L — SIGNIFICANT CHANGE UP (ref 17–32)
CREAT SERPL-MCNC: 2.9 MG/DL — HIGH (ref 0.7–1.5)
EGFR: 21 ML/MIN/1.73M2 — LOW
GLUCOSE SERPL-MCNC: 100 MG/DL — HIGH (ref 70–99)
HCT VFR BLD CALC: 25.3 % — LOW (ref 42–52)
HGB BLD-MCNC: 8.1 G/DL — LOW (ref 14–18)
MAGNESIUM SERPL-MCNC: 1.4 MG/DL — LOW (ref 1.8–2.4)
MCHC RBC-ENTMCNC: 27.8 PG — SIGNIFICANT CHANGE UP (ref 27–31)
MCHC RBC-ENTMCNC: 32 G/DL — SIGNIFICANT CHANGE UP (ref 32–37)
MCV RBC AUTO: 86.9 FL — SIGNIFICANT CHANGE UP (ref 80–94)
NRBC # BLD: 0 /100 WBCS — SIGNIFICANT CHANGE UP (ref 0–0)
PLATELET # BLD AUTO: 319 K/UL — SIGNIFICANT CHANGE UP (ref 130–400)
POTASSIUM SERPL-MCNC: 4.9 MMOL/L — SIGNIFICANT CHANGE UP (ref 3.5–5)
POTASSIUM SERPL-SCNC: 4.9 MMOL/L — SIGNIFICANT CHANGE UP (ref 3.5–5)
PROT SERPL-MCNC: 5.8 G/DL — LOW (ref 6–8)
RBC # BLD: 2.91 M/UL — LOW (ref 4.7–6.1)
RBC # FLD: 15.4 % — HIGH (ref 11.5–14.5)
SODIUM SERPL-SCNC: 143 MMOL/L — SIGNIFICANT CHANGE UP (ref 135–146)
WBC # BLD: 7.92 K/UL — SIGNIFICANT CHANGE UP (ref 4.8–10.8)
WBC # FLD AUTO: 7.92 K/UL — SIGNIFICANT CHANGE UP (ref 4.8–10.8)

## 2022-10-16 PROCEDURE — 99232 SBSQ HOSP IP/OBS MODERATE 35: CPT

## 2022-10-16 RX ORDER — LACTULOSE 10 G/15ML
20 SOLUTION ORAL
Refills: 0 | Status: DISCONTINUED | OUTPATIENT
Start: 2022-10-16 | End: 2022-10-17

## 2022-10-16 RX ORDER — MAGNESIUM SULFATE 500 MG/ML
2 VIAL (ML) INJECTION ONCE
Refills: 0 | Status: COMPLETED | OUTPATIENT
Start: 2022-10-16 | End: 2022-10-16

## 2022-10-16 RX ADMIN — SODIUM ZIRCONIUM CYCLOSILICATE 10 GRAM(S): 10 POWDER, FOR SUSPENSION ORAL at 15:40

## 2022-10-16 RX ADMIN — Medication 1300 MILLIGRAM(S): at 14:18

## 2022-10-16 RX ADMIN — HEPARIN SODIUM 5000 UNIT(S): 5000 INJECTION INTRAVENOUS; SUBCUTANEOUS at 21:21

## 2022-10-16 RX ADMIN — Medication 40 MILLIGRAM(S): at 05:19

## 2022-10-16 RX ADMIN — Medication 1300 MILLIGRAM(S): at 21:21

## 2022-10-16 RX ADMIN — HEPARIN SODIUM 5000 UNIT(S): 5000 INJECTION INTRAVENOUS; SUBCUTANEOUS at 14:17

## 2022-10-16 RX ADMIN — Medication 25 GRAM(S): at 12:33

## 2022-10-16 RX ADMIN — LACTULOSE 20 GRAM(S): 10 SOLUTION ORAL at 18:03

## 2022-10-16 RX ADMIN — POLYETHYLENE GLYCOL 3350 17 GRAM(S): 17 POWDER, FOR SOLUTION ORAL at 12:00

## 2022-10-16 RX ADMIN — LACTULOSE 20 GRAM(S): 10 SOLUTION ORAL at 12:17

## 2022-10-16 RX ADMIN — Medication 25 MILLIGRAM(S): at 05:19

## 2022-10-16 RX ADMIN — Medication 1300 MILLIGRAM(S): at 05:19

## 2022-10-16 RX ADMIN — SODIUM ZIRCONIUM CYCLOSILICATE 10 GRAM(S): 10 POWDER, FOR SUSPENSION ORAL at 05:19

## 2022-10-16 RX ADMIN — AMLODIPINE BESYLATE 5 MILLIGRAM(S): 2.5 TABLET ORAL at 05:19

## 2022-10-16 RX ADMIN — HEPARIN SODIUM 5000 UNIT(S): 5000 INJECTION INTRAVENOUS; SUBCUTANEOUS at 05:19

## 2022-10-16 NOTE — PROGRESS NOTE ADULT - ASSESSMENT
Pt with DM, HTN, CKD 4, presents with increased LE edema and exertional dyspnes.  CKD 4 - near baseline of creat ~3 per pt / follows with Dr. Lozoya  Kidney and bladders sono - no hydro, thin left cortex  UA proteinuria 30, etiology likely HTN, DM / urine pr/cr ratio 0.6g/g  check serum flc ratio, spep/sife  cr stable - trending down  phos level ok, no need for phos binder  High K controlled with diet and Lokelma 3x d  HYPOMAgnesemia of 1.4 - please give MAg rider 2 gr ICV and repeat tomorrow  continue  sodium bicarb  1300mg q8h  Anemia -  iron stores ar elow, Tsat 20% Ferr 320, start Venofer 200 mg IV qod for 2 doses, then Aranesp 25 sq qweek  will follow

## 2022-10-16 NOTE — PROGRESS NOTE ADULT - SUBJECTIVE AND OBJECTIVE BOX
Nephrology progress note    Patient is seen and examined, events over the last 24 h noted .    Allergies:  No Known Allergies    Hospital Medications:   MEDICATIONS  (STANDING):  amLODIPine   Tablet 5 milliGRAM(s) Oral daily  furosemide    Tablet 40 milliGRAM(s) Oral daily  heparin   Injectable 5000 Unit(s) SubCutaneous every 8 hours  influenza  Vaccine (HIGH DOSE) 0.7 milliLiter(s) IntraMuscular once  lactulose Syrup 20 Gram(s) Oral two times a day  metoprolol succinate ER 25 milliGRAM(s) Oral daily  polyethylene glycol 3350 17 Gram(s) Oral daily  senna 2 Tablet(s) Oral at bedtime  sodium bicarbonate 1300 milliGRAM(s) Oral three times a day  sodium zirconium cyclosilicate 10 Gram(s) Oral three times a day        VITALS:  T(F): 97.8 (10-16-22 @ 12:34), Max: 98.4 (10-16-22 @ 05:42)  HR: 93 (10-16-22 @ 12:34)  BP: 146/67 (10-16-22 @ 12:34)  RR: 19 (10-16-22 @ 12:34)  SpO2: --  Wt(kg): --    10-14 @ 07:  -  10-15 @ 07:00  --------------------------------------------------------  IN: 0 mL / OUT: 210 mL / NET: -210 mL    10-15 @ 07:  -  10-16 @ 07:00  --------------------------------------------------------  IN: 0 mL / OUT: 1200 mL / NET: -1200 mL    10-16 @ 07:01  -  10-16 @ 13:06  --------------------------------------------------------  IN: 0 mL / OUT: 500 mL / NET: -500 mL          PHYSICAL EXAM:  Constitutional: NAD  HEENT: anicteric sclera  Neck: No JVD  Respiratory: CTA  Cardiovascular: S1, S2, RRR  Gastrointestinal: BS+, soft, NT/ND  Extremities: No peripheral edema  Neurological: resting in bed  : No CVA tenderness. No diaz.   Skin: No rashes  Vascular Access:    LABS:  10-16    143  |  109  |  52<H>  ----------------------------<  100<H>  4.9   |  22  |  2.9<H>    Ca    8.3<L>      16 Oct 2022 08:07  Mg     1.4     10-16    TPro  5.8<L>  /  Alb  2.8<L>  /  TBili  0.2  /  DBili      /  AST  21  /  ALT  19  /  AlkPhos  312<H>  10-16                          8.1    7.92  )-----------( 319      ( 16 Oct 2022 08:07 )             25.3       Urine Studies:  Urinalysis Basic - ( 12 Oct 2022 16:45 )    Color: Yellow / Appearance: Slightly Turbid / S.011 / pH:   Gluc:  / Ketone: Trace  / Bili: Negative / Urobili: <2 mg/dL   Blood:  / Protein: 30 mg/dL / Nitrite: Negative   Leuk Esterase: Large / RBC: 13 /HPF /  /HPF   Sq Epi:  / Non Sq Epi: 1 /HPF / Bacteria: Many      Sodium, Random Urine: 128.0 mmoL/L (10-13 @ 18:30)  Osmolality, Random Urine: 341 mos/kg (10-13 @ 18:30)  Potassium, Random Urine: 8 mmol/L (10-13 @ 18:30)  Creatinine, Random Urine: 18 mg/dL (10-13 @ 18:26)  Protein/Creatinine Ratio Calculation: 0.6 Ratio (10-13 @ 18:26)    RADIOLOGY & ADDITIONAL STUDIES:

## 2022-10-16 NOTE — CONSULT NOTE ADULT - CONSULT REQUESTED DATE/TIME
14-Oct-2022 18:56
16-Oct-2022 12:52
13-Oct-2022 08:38
13-Oct-2022 12:05
13-Oct-2022 13:27
13-Oct-2022 14:40

## 2022-10-16 NOTE — CONSULT NOTE ADULT - SUBJECTIVE AND OBJECTIVE BOX
HPI:  77 year old male with a history of DM poorly controlled and HTN presents to the ED with bilateral lower extremity edema. Patient reports that his legs have been swollen for months. In the past few weeks swelling has worsened and his family who is at bedside noted that patient has limited capability to ambulate despite use of a walker. He has been spending most of his time sitting and/or laying down and has developed decubitus ulcer on his buttock and proximal left leg. Also notes ulcerations of his right shin with clear liquid drainage. Admits to cramping in the legs and weakness bilaterally. No erythema of the legs, numbness/tingling, bruising. Admits to chronic dyspnea on exertion and urinary frequency. Further admits to excessive loose stools and diarrhea over the past few weeks, no blood. Denies fever/chills, chest pain, abdominal pain, nausea/vomiting, constipation.  Patient has not seen a doctor since before the pandemic. His current medication list is  from 2020.     ED course : /66, , RR 18, T 98.7, Spo2 98 on RA . Labs significant for hgb 8.9, K 5.4, Hco3 15, AG 17, BUN 74, Cr 3.3, trop 0.14, BNP 1174. CXR notable for Patchy left basilar opacities, noting the dominant opacity is somewhat ovoid measuring up to 2.6 cm. B/l Lower extremity duplex showed no evidence of deep venous thrombosis or superficial thrombophlebitis in the bilateral lower extremities.      bilateral lower extremity edema limiting his mobility likely 2/2 to advanced CKD that improved with lasix  - US kidney: thinning of left renal parenchyma, otherwise unremarkable   -not complaining of chest pain   - Echo: LVH, normal LVEF 70%, no valve disease         PAST MEDICAL & SURGICAL HISTORY:  CKD (chronic kidney disease)      Hypertension      Type 2 diabetes mellitus          Hospital Course:    TODAY'S SUBJECTIVE & REVIEW OF SYMPTOMS:     Constitutional WNL   Cardio WNL   Resp WNL   GI WNL  Heme WNL  Endo WNL  Skin WNL  MSK WNL  Neuro WNL  Cognitive WNL  Psych WNL      MEDICATIONS  (STANDING):  amLODIPine   Tablet 5 milliGRAM(s) Oral daily  heparin   Injectable 5000 Unit(s) SubCutaneous every 8 hours  influenza  Vaccine (HIGH DOSE) 0.7 milliLiter(s) IntraMuscular once  metoprolol succinate ER 25 milliGRAM(s) Oral daily  sodium bicarbonate 1300 milliGRAM(s) Oral three times a day    MEDICATIONS  (PRN):      FAMILY HISTORY:      Allergies    No Known Allergies    Intolerances        SOCIAL HISTORY:    [  ] Etoh  [  ] Smoking  [  ] Substance abuse     Home Environment:  [ x  ] Home Alone  [   ] Lives with Family  [   ] Home Health Aid    Dwelling:  [   ] Apartment  [ x  ] Private House  [   ] Adult Home  [   ] Skilled Nursing Facility      [   ] Short Term  [   ] Long Term  [  x ] Stairs       Elevator [   ]    FUNCTIONAL STATUS PTA: (Check all that apply)  Ambulation: [  x]Independent    [   ] Dependent     [   ] Non-Ambulatory  Assistive Device: [   ] SA Cane  [   ]  Q Cane  [x   ] Walker  [   ]  Wheelchair  ADL : [  x ] Independent  [    ]  Dependent       Vital Signs Last 24 Hrs  T(C): 36 (14 Oct 2022 13:24), Max: 36.7 (13 Oct 2022 20:00)  T(F): 96.8 (14 Oct 2022 13:24), Max: 98 (13 Oct 2022 20:00)  HR: 105 (14 Oct 2022 13:24) (94 - 105)  BP: 106/59 (14 Oct 2022 13:24) (106/59 - 130/63)  BP(mean): --  RR: 18 (14 Oct 2022 13:24) (18 - 18)  SpO2: 96% (14 Oct 2022 07:50) (96% - 96%)    Parameters below as of 14 Oct 2022 07:50  Patient On (Oxygen Delivery Method): room air          PHYSICAL EXAM: Awake & Alert  GENERAL: NAD  HEAD:  Normocephalic  CHEST/LUNG: Clear   HEART: S1S2+  ABDOMEN: Soft, Nontender  EXTREMITIES:  + adama LE edema     NERVOUS SYSTEM:  Cranial Nerves 2-12 intact [   ] Abnormal  [   ]  ROM: WFL all extremities [ x  ]  Abnormal [   ]  Motor Strength: WFL all extremities  [ x  ]  Abnormal [   ]  Sensation: intact to light touch [  x ] Abnormal [   ]    FUNCTIONAL STATUS:  Bed Mobility: Independent [   ]  Supervision [   ]  Needs Assistance [  x ]  N/A [   ]  Transfers: Independent [   ]  Supervision [   ]  Needs Assistance [ x]  N/A [   ]   Ambulation: Independent [   ]  Supervision [   ]  Needs Assistance [   x]  N/A [   ]  ADL: Independent [   ] Requires Assistance [   ] N/A [   ]      LABS:                        8.5    8.22  )-----------( 287      ( 14 Oct 2022 07:26 )             26.7     10-14    144  |  113<H>  |  63<HH>  ----------------------------<  112<H>  5.5<H>   |  13<L>  |  3.3<H>    Ca    8.2<L>      14 Oct 2022 07:26  Phos  4.9     10-13  Mg     1.8     10-14    TPro  5.7<L>  /  Alb  2.6<L>  /  TBili  <0.2  /  DBili  x   /  AST  24  /  ALT  19  /  AlkPhos  357<H>  10-14          RADIOLOGY & ADDITIONAL STUDIES:  
Podiatry Consult Note    Subjective:    NEVIN SHELL is a 77y year old Male seen at bedside with a chief complaint of  painful thickened, dystrophic, ingrowing and long toenails digits 1-5 bilaterally  and preventative foot examination. Patient is medically managed  by Medicine/Hospitalists.  Patient denies any history of trauma to both feet. Patient has no other pedal complaints.  Patient is experiencing pain while standing, walking and in shoe gear. Pt states he does not have a Podiatrist.     PMH: CKD (chronic kidney disease)    Hypertension    Type 2 diabetes mellitus     PAST MEDICAL & SURGICAL HISTORY:  CKD (chronic kidney disease)      Hypertension      Type 2 diabetes mellitus        PSH:  Allergies:No Known Allergies      Labs:                        8.1    7.92  )-----------( 319      ( 16 Oct 2022 08:07 )             25.3       WBC Trend  7.92 Date (10-16 @ 08:07)  8.22 Date (10-14 @ 07:26)  8.82 Date (10-13 @ 18:34)  8.52 Date (10-13 @ 12:42)  8.70 Date (10-12 @ 15:04)      Chem  10-16    143  |  109  |  52<H>  ----------------------------<  100<H>  4.9   |  22  |  2.9<H>    Ca    8.3<L>      16 Oct 2022 08:07  Mg     1.4     10-16    TPro  5.8<L>  /  Alb  2.8<L>  /  TBili  0.2  /  DBili  x   /  AST  21  /  ALT  19  /  AlkPhos  312<H>  10-16      T(F): 97.8 (10-16-22 @ 12:34), Max: 98.4 (10-16-22 @ 05:42)  HR: 93 (10-16-22 @ 12:34) (88 - 93)  BP: 146/67 (10-16-22 @ 12:34) (129/60 - 146/67)  RR: 19 (10-16-22 @ 12:34) (18 - 19)  SpO2: --  Wt(kg): --    Objective:   DERM:  Skin warm, dry and supple bilateral.  No open lesions or inter-digital macerations noted bilateral.   Toenails 1-5 Right and Left feet thickened, elongated, discolored, and dystrophic with subungual debris. There is pain upon palpation of all fungal and ingrowing nails 1-5 bilaterally.   VASC: Dorsalis Pedis and Posterior Tibial pulses 1/4.    NEURO: Protective sensation diminished to the level of the digits bilateral.  MSK: Muscle strength 5/5 all major muscle groups bilateral. No structural abnormality, bilaterally    Assessment:   Bilateral dystrophic toenails consistent with Onychomycosis.   Pain from Elongated nails, ingrowing, incurvated, and dystrophic nails upon palpation of nails.  Xerosis of bilateral plantar feet.     Plan:   Discussed diagnosis and treatment with patient  Aseptic debridement and curettage of all fungal and ingrowing nails 1-5 bilateral with sterile nail nipper.  Discussed importance of daily foot examinations, drying of feet after bathing and proper shoe gear.  Patient Would Benefit From Periodic Debridements; Can Follow Up as Outpatient w/ Dr. Peng Post Discharge at [address] [place] [address].  Discussed Plan w/ Attending;     Spectra; [phone]   
NEPHROLOGY CONSULTATION NOTE    77 year old male with a history of DM poorly controlled and HTN presents to the ED with bilateral lower extremity edema. Patient reports that his legs have been swollen for months. In the past few weeks swelling has worsened and his family who is at bedside noted that patient has limited capability to ambulate despite use of a walker. He has been spending most of his time sitting and/or laying down and has developed decubitus ulcer on his buttock and proximal left leg. Also notes ulcerations of his right shin with clear liquid drainage. No erythema of the legs, numbness/tingling, bruising. Admits to chronic dyspnea on exertion and urinary frequency. Further admits to excessive loose stools and diarrhea over the past few weeks, no blood. Denies fever/chills, chest pain, abdominal pain, nausea/vomiting, constipation.  Patient has not seen a doctor since before the pandemic. His current medication list is  from .     ED course : /66, , RR 18, T 98.7, Spo2 98 on RA . Labs significant for hgb 8.9, K 5.4, Hco3 15, AG 17, BUN 74, Cr 3.3, trop 0.14, BNP 1174. CXR notable for Patchy left basilar opacities, noting the dominant opacity is somewhat ovoid measuring up to 2.6 cm. B/l Lower extremity duplex showed no evidence of deep venous thrombosis or superficial thrombophlebitis in the bilateral lower extremities.    Pt was seen by Dr Lozyoa 2 years ago, creat was supposedly ~3.0 MG%    PAST MEDICAL & SURGICAL HISTORY:  CKD (chronic kidney disease)      Hypertension      Type 2 diabetes mellitus        Allergies:  No Known Allergies    Home Medications Reviewed  Hospital Medications:   MEDICATIONS  (STANDING):  amLODIPine   Tablet 5 milliGRAM(s) Oral daily  heparin   Injectable 5000 Unit(s) SubCutaneous every 8 hours  sodium bicarbonate 650 milliGRAM(s) Oral every 8 hours      SOCIAL HISTORY:  Denies ETOH,Smoking,   FAMILY HISTORY:        REVIEW OF SYSTEMS:  CONSTITUTIONAL: No weakness, fevers or chills  RESPIRATORY: No cough, wheezing, hemoptysis; Mild shortness of breath on eexertion  CARDIOVASCULAR: No chest pain or palpitations.  GASTROINTESTINAL: No abdominal or epigastric pain. No nausea, vomiting, or hematemesis; Has diarrhea   GENITOURINARY: No dysuria, frequency, foamy urine, urinary urgency, incontinence or hematuria  SKIN: No itching, burning, rashes, or lesions   VASCULAR: Has bilateral lower extremity lymphedema.   All other review of systems is negative unless indicated above.    VITALS:  T(F): 98.6 (10-12-22 @ 21:30), Max: 98.7 (10-12-22 @ 13:35)  HR: 105 (10-12-22 @ 21:30)  BP: 137/63 (10-12-22 @ 21:30)  RR: 18 (10-12-22 @ 21:30)  SpO2: 100% (10-12-22 @ 21:30)      Weight (kg): 117.9 (10-12 @ 13:35)    I&O's Detail        PHYSICAL EXAM:  Constitutional: NAD  HEENT: anicteric sclera,  Neck: No JVD  Respiratory: BS + rhonchi+  Cardiovascular: S1, S2, RRR  Gastrointestinal: BS+, soft, NT/ND  Extremities: b/l lymphedema peripheral edema  Neurological: A/O x 3  Psychiatric: Normal mood, normal affect  : No CVA tenderness. No diaz.   Skin: No rashes  Vascular Access:    LABS:  10-12    140  |  108  |  74<HH>  ----------------------------<  89  5.4<H>   |  15<L>  |  3.3<H>    Ca    8.3<L>      12 Oct 2022 15:04    TPro  6.7  /  Alb  3.7  /  TBili  0.3  /  DBili      /  AST  33  /  ALT  28  /  AlkPhos  457<H>  10-12    Creatinine Trend: 3.3 <--                        8.9    8.70  )-----------( 378      ( 12 Oct 2022 15:04 )             27.7     Urine Studies:  Urinalysis Basic - ( 12 Oct 2022 16:45 )    Color: Yellow / Appearance: Slightly Turbid / S.011 / pH:   Gluc:  / Ketone: Trace  / Bili: Negative / Urobili: <2 mg/dL   Blood:  / Protein: 30 mg/dL / Nitrite: Negative   Leuk Esterase: Large / RBC: 13 /HPF /  /HPF   Sq Epi:  / Non Sq Epi: 1 /HPF / Bacteria: Many                RADIOLOGY & ADDITIONAL STUDIES:    < from: US Kidney and Bladder (10.13.22 @ 04:25) >  Right kidney: 10.1 cm. No hydronephrosis or calculi.    Left kidney:  9.3 cm. Somewhat thinned parenchyma without hydronephrosis   or nephrolithiasis.    Urinary bladder: No debris or calculus.  Patient voided prior to the   study.    IMPRESSION:    Somewhat thinned left renal parenchyma, otherwise unremarkable study.    < end of copied text >  < from: Xray Chest 1 View- PORTABLE-Urgent (Xray Chest 1 View- PORTABLE-Urgent .) (10.12.22 @ 15:17) >  Patchy left basilar opacities, noting the dominant opacity is somewhat   ovoid measuring up to 2.6 cm. Findings may represent pneumonia in the   appropriateclinical setting. However follow-up to resolution is needed   as a more aggressive process/neoplastic etiology cannot be excluded on   the basis of this radiograph.    < end of copied text >              
Patient is a 77y old  Male who presents with a chief complaint of bilateral lower extremity edema (12 Oct 2022 20:41)      HPI:  77 year old male with a history of DM poorly controlled and HTN presents to the ED with bilateral lower extremity edema. Patient reports that his legs have been swollen for months. In the past few weeks swelling has worsened and his family who is at bedside noted that patient has limited capability to ambulate despite use of a walker. He has been spending most of his time sitting and/or laying down and has developed decubitus ulcer on his buttock and proximal left leg. Also notes ulcerations of his right shin with clear liquid drainage. Admits to cramping in the legs and weakness bilaterally. No erythema of the legs, numbness/tingling, bruising. Admits to chronic dyspnea on exertion and urinary frequency. Further admits to excessive loose stools and diarrhea over the past few weeks, no blood. Denies fever/chills, chest pain, abdominal pain, nausea/vomiting, constipation.  Patient has not seen a doctor since before the pandemic. His current medication list is  from .     ED course : /66, , RR 18, T 98.7, Spo2 98 on RA . Labs significant for hgb 8.9, K 5.4, Hco3 15, AG 17, BUN 74, Cr 3.3, trop 0.14, BNP 1174. CXR notable for Patchy left basilar opacities, noting the dominant opacity is somewhat ovoid measuring up to 2.6 cm. B/l Lower extremity duplex showed no evidence of deep venous thrombosis or superficial thrombophlebitis in the bilateral lower extremities.       (12 Oct 2022 20:41)      PAST MEDICAL & SURGICAL HISTORY:  CKD (chronic kidney disease)      Hypertension      Type 2 diabetes mellitus          SOCIAL HX:   ex smoker                   FAMILY HISTORY:  .  No cardiovascular or pulmonary family history     REVIEW OF SYSTEMS:    All ROS are negative exept per HPI       Allergies    No Known Allergies    Intolerances          PHYSICAL EXAM  Vital Signs Last 24 Hrs  T(C): 37 (12 Oct 2022 21:30), Max: 37.1 (12 Oct 2022 13:35)  T(F): 98.6 (12 Oct 2022 21:30), Max: 98.7 (12 Oct 2022 13:35)  HR: 105 (12 Oct 2022 21:30) (105 - 110)  BP: 137/63 (12 Oct 2022 21:30) (137/63 - 145/66)  BP(mean): --  RR: 18 (12 Oct 2022 21:30) (18 - 18)  SpO2: 100% (12 Oct 2022 21:30) (98% - 100%)    Parameters below as of 12 Oct 2022 13:35  Patient On (Oxygen Delivery Method): room air        CONSTITUTIONAL:  Well nourished.  NAD    ENT:   Airway patent,   No thrush    EYES:   Clear bilaterally,   pupils equal,   round and reactive to light.    CARDIAC:   Normal rate,   regular rhythm.    no edema      RESPIRATORY:   No wheezing   Normal chest expansion  Not tachypneic,  No use of accessory muscles    GASTROINTESTINAL:  Abdomen soft, non-tender,   No guarding,   Positive BS    MUSCULOSKELETAL:   Range of motion is not limited,  No clubbing, cyanosis    NEUROLOGICAL:   Alert and oriented   No motor deficits.    SKIN:   Skin normal color for race,   No evidence of rash.            LABS:                          8.9    8.70  )-----------( 378      ( 12 Oct 2022 15:04 )             27.7                                               10-12    140  |  108  |  74<HH>  ----------------------------<  89  5.4<H>   |  15<L>  |  3.3<H>    Ca    8.3<L>      12 Oct 2022 15:04    TPro  6.7  /  Alb  3.7  /  TBili  0.3  /  DBili  x   /  AST  33  /  ALT  28  /  AlkPhos  457<H>  10-12                                             Urinalysis Basic - ( 12 Oct 2022 16:45 )    Color: Yellow / Appearance: Slightly Turbid / S.011 / pH: x  Gluc: x / Ketone: Trace  / Bili: Negative / Urobili: <2 mg/dL   Blood: x / Protein: 30 mg/dL / Nitrite: Negative   Leuk Esterase: Large / RBC: 13 /HPF /  /HPF   Sq Epi: x / Non Sq Epi: 1 /HPF / Bacteria: Many        CARDIAC MARKERS ( 12 Oct 2022 22:45 )  x     / 0.12 ng/mL / x     / x     / x      CARDIAC MARKERS ( 12 Oct 2022 15:04 )  x     / 0.14 ng/mL / x     / x     / x                                                LIVER FUNCTIONS - ( 12 Oct 2022 15:04 )  Alb: 3.7 g/dL / Pro: 6.7 g/dL / ALK PHOS: 457 U/L / ALT: 28 U/L / AST: 33 U/L / GGT: x                                                                                                MEDICATIONS  (STANDING):  amLODIPine   Tablet 5 milliGRAM(s) Oral daily  heparin   Injectable 5000 Unit(s) SubCutaneous every 8 hours  sodium bicarbonate 650 milliGRAM(s) Oral every 8 hours    MEDICATIONS  (PRN):      X-Rays reviewed:    CXR interpreted by me:
Patient is a 77y old  Male who presents with a chief complaint of bilateral lower extremity edema (13 Oct 2022 12:05)      HPI:  77 year old male with a history of DM poorly controlled and HTN presents to the ED with bilateral lower extremity edema. Patient reports that his legs have been swollen for months. In the past few weeks swelling has worsened and his family who is at bedside noted that patient has limited capability to ambulate despite use of a walker. He has been spending most of his time sitting and/or laying down and has developed decubitus ulcer on his buttock and proximal left leg. Also notes ulcerations of his right shin with clear liquid drainage. Admits to cramping in the legs and weakness bilaterally. No erythema of the legs, numbness/tingling, bruising. Admits to chronic dyspnea on exertion and urinary frequency. Further admits to excessive loose stools and diarrhea over the past few weeks, no blood. Denies fever/chills, chest pain, abdominal pain, nausea/vomiting, constipation.  Patient has not seen a doctor since before the pandemic. His current medication list is  from .     ED course : /66, , RR 18, T 98.7, Spo2 98 on RA . Labs significant for hgb 8.9, K 5.4, Hco3 15, AG 17, BUN 74, Cr 3.3, trop 0.14, BNP 1174. CXR notable for Patchy left basilar opacities, noting the dominant opacity is somewhat ovoid measuring up to 2.6 cm. B/l Lower extremity duplex showed no evidence of deep venous thrombosis or superficial thrombophlebitis in the bilateral lower extremities.       (12 Oct 2022 20:41)      PAST MEDICAL & SURGICAL HISTORY:  CKD (chronic kidney disease)      Hypertension      Type 2 diabetes mellitus          SOCIAL HX:   ex smoker    FAMILY HISTORY:  .  No cardiovascular or pulmonary family history     REVIEW OF SYSTEMS:    All ROS are negative exept per HPI       Allergies    No Known Allergies    Intolerances          PHYSICAL EXAM  Vital Signs Last 24 Hrs  T(C): 37 (12 Oct 2022 21:30), Max: 37.1 (12 Oct 2022 13:35)  T(F): 98.6 (12 Oct 2022 21:30), Max: 98.7 (12 Oct 2022 13:35)  HR: 105 (12 Oct 2022 21:30) (105 - 110)  BP: 137/63 (12 Oct 2022 21:30) (137/63 - 145/66)  BP(mean): --  RR: 18 (12 Oct 2022 21:30) (18 - 18)  SpO2: 100% (12 Oct 2022 21:30) (98% - 100%)    Parameters below as of 12 Oct 2022 13:35  Patient On (Oxygen Delivery Method): room air        CONSTITUTIONAL:  NAD    ENT:   Airway patent,   No thrush    EYES:   Clear bilaterally,   pupils equal,   round and reactive to light.    CARDIAC:   Normal rate,   regular rhythm.    no edema    RESPIRATORY:   No wheezing   Normal chest expansion  Not tachypneic,  No use of accessory muscles    GASTROINTESTINAL:  Abdomen soft, non-tender,   No guarding,   Positive BS    MUSCULOSKELETAL:   Range of motion is not limited,  No clubbing, cyanosis    NEUROLOGICAL:   Alert and oriented   No motor deficits.    SKIN:   Skin normal color for race,   No evidence of rash        LABS:                          8.3    8.52  )-----------( 351      ( 13 Oct 2022 12:42 )             25.6                                               10-12    140  |  108  |  74<HH>  ----------------------------<  89  5.4<H>   |  15<L>  |  3.3<H>    Ca    8.3<L>      12 Oct 2022 15:04    TPro  6.7  /  Alb  3.7  /  TBili  0.3  /  DBili  x   /  AST  33  /  ALT  28  /  AlkPhos  457<H>  10-12      Urinalysis Basic - ( 12 Oct 2022 16:45 )    Color: Yellow / Appearance: Slightly Turbid / S.011 / pH: x  Gluc: x / Ketone: Trace  / Bili: Negative / Urobili: <2 mg/dL   Blood: x / Protein: 30 mg/dL / Nitrite: Negative   Leuk Esterase: Large / RBC: 13 /HPF /  /HPF   Sq Epi: x / Non Sq Epi: 1 /HPF / Bacteria: Many      CARDIAC MARKERS ( 12 Oct 2022 22:45 )  x     / 0.12 ng/mL / x     / x     / x      CARDIAC MARKERS ( 12 Oct 2022 15:04 )  x     / 0.14 ng/mL / x     / x     / x        LIVER FUNCTIONS - ( 12 Oct 2022 15:04 )  Alb: 3.7 g/dL / Pro: 6.7 g/dL / ALK PHOS: 457 U/L / ALT: 28 U/L / AST: 33 U/L / GGT: x                                                                                MEDICATIONS  (STANDING):  amLODIPine   Tablet 5 milliGRAM(s) Oral daily  heparin   Injectable 5000 Unit(s) SubCutaneous every 8 hours  sodium bicarbonate 650 milliGRAM(s) Oral every 8 hours    MEDICATIONS  (PRN):          CXR interpreted by me: left lower lobe opacity
Patient is a 77y old  Male who presents with a chief complaint of bilateral lower extremity edema (13 Oct 2022 13:27)    HPI:  77 year old male with a history of DM poorly controlled and HTN presents to the ED with bilateral lower extremity edema. Patient reports that his legs have been swollen for months. In the past few weeks swelling has worsened and his family who is at bedside noted that patient has limited capability to ambulate despite use of a walker. He has been spending most of his time sitting and/or laying down and has developed decubitus ulcer on his buttock and proximal left leg. Also notes ulcerations of his right shin with clear liquid drainage. Admits to cramping in the legs and weakness bilaterally. No erythema of the legs, numbness/tingling, bruising. Admits to chronic dyspnea on exertion and urinary frequency. Further admits to excessive loose stools and diarrhea over the past few weeks, no blood. Denies fever/chills, chest pain, abdominal pain, nausea/vomiting, constipation.  Patient has not seen a doctor since before the pandemic. His current medication list is  from 2020.     ED course : /66, , RR 18, T 98.7, Spo2 98 on RA . Labs significant for hgb 8.9, K 5.4, Hco3 15, AG 17, BUN 74, Cr 3.3, trop 0.14, BNP 1174. CXR notable for Patchy left basilar opacities, noting the dominant opacity is somewhat ovoid measuring up to 2.6 cm. B/l Lower extremity duplex showed no evidence of deep venous thrombosis or superficial thrombophlebitis in the bilateral lower extremities.    We were consulted for evaluation of lower extremities and sacrum. Pt denies any current complaints.     PAST MEDICAL & SURGICAL HISTORY:  CKD (chronic kidney disease)  Hypertension  Type 2 diabetes mellitus    Allergies  No Known Allergies    PHYSICAL EXAM    ICU Vital Signs Last 24 Hrs  T(C): 35.9 (13 Oct 2022 14:22), Max: 37 (12 Oct 2022 21:30)  T(F): 96.7 (13 Oct 2022 14:22), Max: 98.6 (12 Oct 2022 21:30)  HR: 105 (13 Oct 2022 14:22) (91 - 105)  BP: 124/56 (13 Oct 2022 14:22) (124/56 - 137/63)  RR: 18 (13 Oct 2022 14:22) (18 - 18)  SpO2: 95% (13 Oct 2022 14:22) (95% - 100%)    O2 Parameters below as of 13 Oct 2022 07:30  Patient On (Oxygen Delivery Method): room air                          8.3    8.52  )-----------( 351      ( 13 Oct 2022 12:42 )             25.6       PHYSICAL EXAM:  GENERAL: NAD, well-developed  HEAD:  Atraumatic, Normocephalic  EYES: EOMI, conjunctiva clear  CHEST: No cardiopulmonary compromise.   PSYCH: AAOx3  NEUROLOGY: non-focal, clear and fluent speech.   EXTREMITIES: + bilateral lower extremity edema. hyperpigmentation without definitive erythema. hypertrophic tissue localized to lateral left leg and centralized portion on the right. no open wounds. no bleeding, no foul odor. enlongated toenails.   sacrum: Partial thickness wound. pink moist tissue without evidence of infection. no active bleeding, foul odor, or purulent drainage. no erythema.

## 2022-10-16 NOTE — PROGRESS NOTE ADULT - SUBJECTIVE AND OBJECTIVE BOX
Pt seen and examined at bedside. Reports contipation.     VITAL SIGNS (Last 24 hrs):  T(C): 36.9 (10-16-22 @ 05:42), Max: 36.9 (10-16-22 @ 05:42)  HR: 88 (10-16-22 @ 05:42) (83 - 88)  BP: 129/65 (10-16-22 @ 05:42) (115/56 - 129/65)  RR: 18 (10-16-22 @ 05:42) (18 - 19)  SpO2: --  Wt(kg): --  Daily     Daily     I&O's Summary    15 Oct 2022 07:01  -  16 Oct 2022 07:00  --------------------------------------------------------  IN: 0 mL / OUT: 1200 mL / NET: -1200 mL    16 Oct 2022 07:01  -  16 Oct 2022 11:52  --------------------------------------------------------  IN: 0 mL / OUT: 500 mL / NET: -500 mL        PHYSICAL EXAM:  GENERAL: NAD   HEAD:  Atraumatic, Normocephalic  EYES: conjunctiva and sclera clear  NECK: Supple, No JVD  CHEST/LUNG: Clear to auscultation bilaterally; No wheeze  HEART: Regular rate and rhythm; No murmurs, rubs, or gallops  ABDOMEN: Soft, Nontender, Nondistended; Bowel sounds present  EXTREMITIES:  2+ Peripheral Pulses, No clubbing, cyanosis, or edema  PSYCH: AAOx3  NEUROLOGY: non-focal  SKIN: No rashes or lesions    Labs Reviewed  Spoke to patient in regards to abnormal labs.    CBC Full  -  ( 16 Oct 2022 08:07 )  WBC Count : 7.92 K/uL  Hemoglobin : 8.1 g/dL  Hematocrit : 25.3 %  Platelet Count - Automated : 319 K/uL  Mean Cell Volume : 86.9 fL  Mean Cell Hemoglobin : 27.8 pg  Mean Cell Hemoglobin Concentration : 32.0 g/dL  Auto Neutrophil # : x  Auto Lymphocyte # : x  Auto Monocyte # : x  Auto Eosinophil # : x  Auto Basophil # : x  Auto Neutrophil % : x  Auto Lymphocyte % : x  Auto Monocyte % : x  Auto Eosinophil % : x  Auto Basophil % : x    BMP:    10-16 @ 08:07    Blood Urea Nitrogen - 52  Calcium - 8.3  Carbon  Dioxide - 22  Chloride - 109  Creatinine - 2.9  Glucose - 100  Potassium - 4.9  Sodium - 143           Urine Culture:  10-12 @ 16:45 Urine culture: --    Culture Results:   >100,000 CFU/ml Klebsiella pneumoniae  Method Type: CARITO  Organism: Klebsiella pneumoniae  Organism Identification: Klebsiella pneumoniae  Specimen Source: Clean Catch Clean Catch (Midstream)             MEDICATIONS  (STANDING):  amLODIPine   Tablet 5 milliGRAM(s) Oral daily  furosemide    Tablet 40 milliGRAM(s) Oral daily  heparin   Injectable 5000 Unit(s) SubCutaneous every 8 hours  influenza  Vaccine (HIGH DOSE) 0.7 milliLiter(s) IntraMuscular once  magnesium sulfate  IVPB 2 Gram(s) IV Intermittent once  metoprolol succinate ER 25 milliGRAM(s) Oral daily  polyethylene glycol 3350 17 Gram(s) Oral daily  senna 2 Tablet(s) Oral at bedtime  sodium bicarbonate 1300 milliGRAM(s) Oral three times a day  sodium zirconium cyclosilicate 10 Gram(s) Oral three times a day    MEDICATIONS  (PRN):

## 2022-10-16 NOTE — PROGRESS NOTE ADULT - ASSESSMENT
77 year old male with a history of DM and HTN presents to the ED with bilateral lower extremity edema.    #Acute on Chronic HFpEF  #Chronic venous stasis b/l LE  B/l Lower extremity duplex showed no evidence of deep venous thrombosis or superficial thrombophlebitis in the bilateral lower extremities.  Echo 10/13: 70-75%EF, Grade 1 diastolic dysfunction   - c/w PO Lasix   - Burn eval for wound care  - PT Eval - needs STR     #Left basilar opacity  - f/u CT Chest results - pneumonia versus atelectasis/scarring or loculated fluid. 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

## 2022-10-16 NOTE — CONSULT NOTE ADULT - REASON FOR ADMISSION
bilateral lower extremity edema

## 2022-10-17 VITALS
RESPIRATION RATE: 18 BRPM | TEMPERATURE: 98 F | HEART RATE: 87 BPM | SYSTOLIC BLOOD PRESSURE: 132 MMHG | DIASTOLIC BLOOD PRESSURE: 59 MMHG

## 2022-10-17 LAB
ALBUMIN SERPL ELPH-MCNC: 3.1 G/DL — LOW (ref 3.5–5.2)
ALP SERPL-CCNC: 312 U/L — HIGH (ref 30–115)
ALT FLD-CCNC: 19 U/L — SIGNIFICANT CHANGE UP (ref 0–41)
ANION GAP SERPL CALC-SCNC: 11 MMOL/L — SIGNIFICANT CHANGE UP (ref 7–14)
AST SERPL-CCNC: 23 U/L — SIGNIFICANT CHANGE UP (ref 0–41)
BILIRUB SERPL-MCNC: 0.2 MG/DL — SIGNIFICANT CHANGE UP (ref 0.2–1.2)
BUN SERPL-MCNC: 52 MG/DL — HIGH (ref 10–20)
CALCIUM SERPL-MCNC: 8.1 MG/DL — LOW (ref 8.4–10.5)
CHLORIDE SERPL-SCNC: 105 MMOL/L — SIGNIFICANT CHANGE UP (ref 98–110)
CO2 SERPL-SCNC: 24 MMOL/L — SIGNIFICANT CHANGE UP (ref 17–32)
CREAT SERPL-MCNC: 2.9 MG/DL — HIGH (ref 0.7–1.5)
EGFR: 21 ML/MIN/1.73M2 — LOW
GLUCOSE SERPL-MCNC: 114 MG/DL — HIGH (ref 70–99)
HCT VFR BLD CALC: 24.8 % — LOW (ref 42–52)
HGB BLD-MCNC: 8 G/DL — LOW (ref 14–18)
KAPPA LC SER QL IFE: 12.93 MG/DL — HIGH (ref 0.33–1.94)
KAPPA/LAMBDA FREE LIGHT CHAIN RATIO, SERUM: 1.44 RATIO — SIGNIFICANT CHANGE UP (ref 0.26–1.65)
LAMBDA LC SER QL IFE: 8.96 MG/DL — HIGH (ref 0.57–2.63)
MAGNESIUM SERPL-MCNC: 1.7 MG/DL — LOW (ref 1.8–2.4)
MCHC RBC-ENTMCNC: 28.1 PG — SIGNIFICANT CHANGE UP (ref 27–31)
MCHC RBC-ENTMCNC: 32.3 G/DL — SIGNIFICANT CHANGE UP (ref 32–37)
MCV RBC AUTO: 87 FL — SIGNIFICANT CHANGE UP (ref 80–94)
NRBC # BLD: 0 /100 WBCS — SIGNIFICANT CHANGE UP (ref 0–0)
PLATELET # BLD AUTO: 301 K/UL — SIGNIFICANT CHANGE UP (ref 130–400)
POTASSIUM SERPL-MCNC: 5.4 MMOL/L — HIGH (ref 3.5–5)
POTASSIUM SERPL-SCNC: 5.4 MMOL/L — HIGH (ref 3.5–5)
PROT SERPL-MCNC: 5.7 G/DL — LOW (ref 6–8)
RBC # BLD: 2.85 M/UL — LOW (ref 4.7–6.1)
RBC # FLD: 15.6 % — HIGH (ref 11.5–14.5)
SARS-COV-2 RNA SPEC QL NAA+PROBE: SIGNIFICANT CHANGE UP
SODIUM SERPL-SCNC: 140 MMOL/L — SIGNIFICANT CHANGE UP (ref 135–146)
WBC # BLD: 9.14 K/UL — SIGNIFICANT CHANGE UP (ref 4.8–10.8)
WBC # FLD AUTO: 9.14 K/UL — SIGNIFICANT CHANGE UP (ref 4.8–10.8)

## 2022-10-17 PROCEDURE — 99232 SBSQ HOSP IP/OBS MODERATE 35: CPT

## 2022-10-17 RX ORDER — LACTULOSE 10 G/15ML
30 SOLUTION ORAL
Qty: 0 | Refills: 0 | DISCHARGE
Start: 2022-10-17

## 2022-10-17 RX ORDER — IRON SUCROSE 20 MG/ML
200 INJECTION, SOLUTION INTRAVENOUS EVERY 24 HOURS
Refills: 0 | Status: DISCONTINUED | OUTPATIENT
Start: 2022-10-17 | End: 2022-10-17

## 2022-10-17 RX ORDER — FUROSEMIDE 40 MG
1 TABLET ORAL
Qty: 0 | Refills: 0 | DISCHARGE
Start: 2022-10-17

## 2022-10-17 RX ORDER — SODIUM BICARBONATE 1 MEQ/ML
2 SYRINGE (ML) INTRAVENOUS
Qty: 0 | Refills: 0 | DISCHARGE
Start: 2022-10-17

## 2022-10-17 RX ORDER — POLYETHYLENE GLYCOL 3350 17 G/17G
17 POWDER, FOR SOLUTION ORAL
Refills: 0 | Status: DISCONTINUED | OUTPATIENT
Start: 2022-10-17 | End: 2022-10-17

## 2022-10-17 RX ORDER — MAGNESIUM SULFATE 500 MG/ML
2 VIAL (ML) INJECTION ONCE
Refills: 0 | Status: COMPLETED | OUTPATIENT
Start: 2022-10-17 | End: 2022-10-17

## 2022-10-17 RX ORDER — AMLODIPINE BESYLATE 2.5 MG/1
1 TABLET ORAL
Qty: 0 | Refills: 0 | DISCHARGE
Start: 2022-10-17

## 2022-10-17 RX ORDER — METOPROLOL TARTRATE 50 MG
1 TABLET ORAL
Qty: 0 | Refills: 0 | DISCHARGE
Start: 2022-10-17

## 2022-10-17 RX ORDER — POLYETHYLENE GLYCOL 3350 17 G/17G
17 POWDER, FOR SOLUTION ORAL
Qty: 0 | Refills: 0 | DISCHARGE
Start: 2022-10-17

## 2022-10-17 RX ORDER — SENNA PLUS 8.6 MG/1
2 TABLET ORAL
Qty: 0 | Refills: 0 | DISCHARGE
Start: 2022-10-17

## 2022-10-17 RX ORDER — IRON SUCROSE 20 MG/ML
10 INJECTION, SOLUTION INTRAVENOUS
Qty: 0 | Refills: 0 | DISCHARGE
Start: 2022-10-17 | End: 2022-10-19

## 2022-10-17 RX ADMIN — LACTULOSE 20 GRAM(S): 10 SOLUTION ORAL at 05:49

## 2022-10-17 RX ADMIN — Medication 25 MILLIGRAM(S): at 05:48

## 2022-10-17 RX ADMIN — HEPARIN SODIUM 5000 UNIT(S): 5000 INJECTION INTRAVENOUS; SUBCUTANEOUS at 13:05

## 2022-10-17 RX ADMIN — IRON SUCROSE 110 MILLIGRAM(S): 20 INJECTION, SOLUTION INTRAVENOUS at 11:11

## 2022-10-17 RX ADMIN — Medication 25 GRAM(S): at 16:01

## 2022-10-17 RX ADMIN — Medication 1300 MILLIGRAM(S): at 13:03

## 2022-10-17 RX ADMIN — Medication 10 MILLIGRAM(S): at 13:01

## 2022-10-17 RX ADMIN — Medication 40 MILLIGRAM(S): at 05:48

## 2022-10-17 RX ADMIN — AMLODIPINE BESYLATE 5 MILLIGRAM(S): 2.5 TABLET ORAL at 05:48

## 2022-10-17 RX ADMIN — Medication 1300 MILLIGRAM(S): at 05:48

## 2022-10-17 RX ADMIN — POLYETHYLENE GLYCOL 3350 17 GRAM(S): 17 POWDER, FOR SOLUTION ORAL at 13:02

## 2022-10-17 RX ADMIN — HEPARIN SODIUM 5000 UNIT(S): 5000 INJECTION INTRAVENOUS; SUBCUTANEOUS at 05:49

## 2022-10-17 NOTE — PROGRESS NOTE ADULT - ASSESSMENT
78 y/o man with PMH of DM, CKD 4 and HTN presented to the ED with bilateral lower extremity edema.    1. Acute on Chronic HFpEF  Chronic venous stasis b/l LE  B/l Lower extremity duplex showed no evidence of deep venous thrombosis or superficial thrombophlebitis in the bilateral lower extremities.  Echo 10/13: 70-75%EF, Grade 1 diastolic dysfunction   - s/p Lasix IV and now on 40mg PO daily  - seen by Burn: local wound care recommended (no open lesions of LE noted)  - LE elevation  - low sodium diet  - continue PT    2. Left basilar opacity  - CT Chest: pneumonia versus atelectasis/scarring or loculated fluid. Follow-up posttreatment is needed to ensure no underlying mass  - pulm f/u appreciated: likely atelectasis, pneumonia less likely, continue diuresis, needs outpt f/u with repeat CT scan of chest in 6 months    3. JONH on CKD Stage 4  - baseline Cr around 3 per renal - CKD likely due to DM and HTN  - now at baseline  - on sodium bicarb 1300mg q8h  - no hydronephrosis on US  - renal f/u appreciated  - check serum flc ratio, spep/serum immunofixation  - BMP in AM    4. Hyperkalemia  low K diet and monitor  lokelma if K >5.5    5. DM - controlled  A1C 5.9  monitor FS    6. Thoracic ascending aorta   Echo 10/13: 4.2cm dilatation of ascending aorta  - Will need outpatient f/u    7. HTN  - on amlodipine and metoprolol    8. Constipation - continue Bowel regimen and monitor    9. Normocytic anemia - likely due to CKD  iron studies reviewed  venofer x 2 doses and then start DANIELLA    10. DVT PPX:  heparin SQ    11. Hypomagnesemia repleted    full code      Progress Note Handoff  Pending (specify): resolution of constipation, discharge planning to STR    pt informed of the plan of care    Disposition: STR

## 2022-10-17 NOTE — PROGRESS NOTE ADULT - PROVIDER SPECIALTY LIST ADULT
Internal Medicine
Internal Medicine
Nephrology
Nephrology
Internal Medicine
Nephrology
Pulmonology
Hospitalist
Nephrology
Hospitalist

## 2022-10-17 NOTE — PROGRESS NOTE ADULT - ASSESSMENT
Pt with DM, HTN, CKD 4, presents with increased LE edema and exertional dyspnes.  CKD 4 - near baseline of creat ~3 per pt / follows with Dr. Lozoya  Kidney and bladders sono - no hydro, thin left cortex  UA proteinuria 30, etiology likely HTN, DM / urine pr/cr ratio 0.6g/g  check serum flc ratio, spep/sife  cr stable - trending down   last phos level ok, no need for phos binder/ PTH ok   repeat magnesium level   BP well controlled   continue  sodium bicarb  1300mg q8h  Anemia -  start Venofer 200 mg IV qod for 2 doses, then Aranesp 25 sq qweek  will follow

## 2022-10-17 NOTE — DISCHARGE NOTE NURSING/CASE MANAGEMENT/SOCIAL WORK - PATIENT PORTAL LINK FT
You can access the FollowMyHealth Patient Portal offered by [facility] by registering at the following website: [email or web address]. By joining [employer]’s FollowMyHealth portal, you will also be able to view your health information using other applications (apps) compatible with our system.

## 2022-10-17 NOTE — PROGRESS NOTE ADULT - ASSESSMENT
77 year old male with a history of DM, HTN, CKD and HFpEF presents to the ED with bilateral lower extremity edema.    # Acute on Chronic HFpEF  # Chronic venous stasis b/l LE  - Echo 10/13: 70-75%EF, Grade 1 diastolic dysfunction   - c/w PO Lasix   - Burn eval done: wound care only  - PT Eval - needs STR     #Left basilar opacity  - f/u CT Chest results - pneumonia versus atelectasis/scarring or loculated fluid. Follow-up posttreatment is needed to ensure no underlying mass  - Pulmonary on board: Likely atelectatic LLL changes. Recommend outpatient follow up with plan to repeat CT chest in 6 weeks.     # JONH on CKD Stage 4  # Hyperkalemia   # Anemia of chronic disease   - baseline around 3, good urine output   - C/w Lasix   - Cont sodium bicarb    - Nephro on board  - Give venofer 100mg for 2 days, give Aranesp 25 sq qweekk     # TAA  - Echo 10/13: 4.2cm dilatation of ascending aorta  - Will need outpatient f/u    # DM2  - Start ISS if needed    # HTN  - Cont amlodipine 5mg qD    #Constipation   - senna and miralax  - lactulose added  - give bisacodyl rectally     GI prophylaxis: none   DVT PPX, heparinX3   Disposition: STR   Full code

## 2022-10-17 NOTE — PROGRESS NOTE ADULT - REASON FOR ADMISSION
bilateral lower extremity edema

## 2022-10-17 NOTE — PROGRESS NOTE ADULT - SUBJECTIVE AND OBJECTIVE BOX
Patient is a 77y old  Male who presents with a chief complaint of bilateral lower extremity edema (17 Oct 2022 07:32)        Over Night Events:    Remains on room air and feels comfortable  No respiratory complains at the moment         ROS:  See HPI    PHYSICAL EXAM    ICU Vital Signs Last 24 Hrs  T(C): 36.1 (17 Oct 2022 05:14), Max: 36.6 (16 Oct 2022 12:34)  T(F): 96.9 (17 Oct 2022 05:14), Max: 97.8 (16 Oct 2022 12:34)  HR: 85 (17 Oct 2022 05:14) (85 - 93)  BP: 138/74 (17 Oct 2022 05:14) (114/54 - 146/67)  BP(mean): 78 (16 Oct 2022 19:49) (78 - 78)  ABP: --  ABP(mean): --  RR: 18 (17 Oct 2022 05:14) (18 - 19)  SpO2: --        General: NAD  HEENT: PRASAD             Lymphatic system: No cervical LN   Lungs: Bilateral BS  Cardiovascular: Regular   Gastrointestinal: Soft, Positive BS  Extremities: No clubbing.  Moves extremities.  Full Range of motion. Edema in the lower extremities.   Skin: Warm, intact  Neurological: No motor or sensory deficit       10-16-22 @ 07:01  -  10-17-22 @ 07:00  --------------------------------------------------------  IN:  Total IN: 0 mL    OUT:    Voided (mL): 700 mL  Total OUT: 700 mL    Total NET: -700 mL          LABS:                            8.0    9.14  )-----------( 301      ( 17 Oct 2022 07:20 )             24.8                                               10-17    140  |  105  |  52<H>  ----------------------------<  114<H>  5.4<H>   |  24  |  2.9<H>    Ca    8.1<L>      17 Oct 2022 07:20  Mg     1.7     10-17    TPro  5.7<L>  /  Alb  3.1<L>  /  TBili  0.2  /  DBili  x   /  AST  23  /  ALT  19  /  AlkPhos  312<H>  10-17                                                                                           LIVER FUNCTIONS - ( 17 Oct 2022 07:20 )  Alb: 3.1 g/dL / Pro: 5.7 g/dL / ALK PHOS: 312 U/L / ALT: 19 U/L / AST: 23 U/L / GGT: x                                                                                                                                       MEDICATIONS  (STANDING):  amLODIPine   Tablet 5 milliGRAM(s) Oral daily  furosemide    Tablet 40 milliGRAM(s) Oral daily  heparin   Injectable 5000 Unit(s) SubCutaneous every 8 hours  influenza  Vaccine (HIGH DOSE) 0.7 milliLiter(s) IntraMuscular once  iron sucrose IVPB 200 milliGRAM(s) IV Intermittent every 24 hours  lactulose Syrup 20 Gram(s) Oral two times a day  metoprolol succinate ER 25 milliGRAM(s) Oral daily  polyethylene glycol 3350 17 Gram(s) Oral daily  senna 2 Tablet(s) Oral at bedtime  sodium bicarbonate 1300 milliGRAM(s) Oral three times a day    MEDICATIONS  (PRN):

## 2022-10-17 NOTE — PROGRESS NOTE ADULT - SUBJECTIVE AND OBJECTIVE BOX
24H events:    Patient is a 77y old Male who presents with a chief complaint of bilateral lower extremity edema (17 Oct 2022 13:36)    Primary diagnosis of Bilateral leg edema       Today is hospital day 5d.  no acute events overnight, he is constipated, last bowel motion 4 days ago    PAST MEDICAL & SURGICAL HISTORY  CKD (chronic kidney disease)    Hypertension    Type 2 diabetes mellitus      SOCIAL HISTORY:  Negative for smoking/alcohol/drug use.     ALLERGIES:  No Known Allergies    MEDICATIONS:  STANDING MEDICATIONS  amLODIPine   Tablet 5 milliGRAM(s) Oral daily  furosemide    Tablet 40 milliGRAM(s) Oral daily  heparin   Injectable 5000 Unit(s) SubCutaneous every 8 hours  influenza  Vaccine (HIGH DOSE) 0.7 milliLiter(s) IntraMuscular once  iron sucrose IVPB 200 milliGRAM(s) IV Intermittent every 24 hours  lactulose Syrup 20 Gram(s) Oral two times a day  magnesium sulfate  IVPB 2 Gram(s) IV Intermittent once  metoprolol succinate ER 25 milliGRAM(s) Oral daily  polyethylene glycol 3350 17 Gram(s) Oral two times a day  senna 2 Tablet(s) Oral at bedtime  sodium bicarbonate 1300 milliGRAM(s) Oral three times a day    PRN MEDICATIONS    VITALS:   T(F): 98.4  HR: 87  BP: 132/59  RR: 18  SpO2: 96%    LABS:                        8.0    9.14  )-----------( 301      ( 17 Oct 2022 07:20 )             24.8     10-17    140  |  105  |  52<H>  ----------------------------<  114<H>  5.4<H>   |  24  |  2.9<H>    Ca    8.1<L>      17 Oct 2022 07:20  Mg     1.7     10-17    TPro  5.7<L>  /  Alb  3.1<L>  /  TBili  0.2  /  DBili  x   /  AST  23  /  ALT  19  /  AlkPhos  312<H>  10-17        RADIOLOGY:    PHYSICAL EXAM:  GENERAL: NAD  EYES: conjunctiva and sclera clear  ENMT: Moist mucous membranes  NERVOUS SYSTEM:  Alert & Oriented X3, Good concentration  CHEST/LUNG: decreased air entry bilateral lower lungs   HEART: Regular rate and rhythm; No murmurs  ABDOMEN: Soft, Nontender, Nondistended  EXTREMITIES: bilateral lower limb pitting edema with chronic venous insufficiency changes ( darkening of skin tightening of the skin )

## 2022-10-17 NOTE — PROGRESS NOTE ADULT - ASSESSMENT
Impression:    Left lower lobe consolidation Vs atelectasis  Less likely PNA  Chronic kidney disease  HFPEF      Plan:    CT chest 10/13 reviewed with LLL opacity with air bronchograms.  No fevers, no leukocytosis, no cough or phlegm at the moment.  He remains on room air.   Likely atelectatic LLL changes.   He has no fevers, no leukocytosis, no purulent phlegm/cough.   Procalcitonin mildly elevated which is not uncommon in the setting of kidney disease.   Diuretic management per primary team and nephrology.   Recommend outpatient follow up with plan to repeat CT chest in 6 weeks.   Will follow as needed.

## 2022-10-17 NOTE — DISCHARGE NOTE NURSING/CASE MANAGEMENT/SOCIAL WORK - NSDCPEFALRISK_GEN_ALL_CORE
For information on Fall & Injury Prevention, visit: [email or web address] OR  [email or web address] OR  https://www.cdc.gov/steadi/patient.html

## 2022-10-17 NOTE — PROGRESS NOTE ADULT - SUBJECTIVE AND OBJECTIVE BOX
seen and examined  24 h events noted   no distress         PAST HISTORY  --------------------------------------------------------------------------------  No significant changes to PMH, PSH, FHx, SHx, unless otherwise noted    ALLERGIES & MEDICATIONS  --------------------------------------------------------------------------------  Allergies    No Known Allergies    Intolerances      Standing Inpatient Medications  amLODIPine   Tablet 5 milliGRAM(s) Oral daily  furosemide    Tablet 40 milliGRAM(s) Oral daily  heparin   Injectable 5000 Unit(s) SubCutaneous every 8 hours  influenza  Vaccine (HIGH DOSE) 0.7 milliLiter(s) IntraMuscular once  lactulose Syrup 20 Gram(s) Oral two times a day  metoprolol succinate ER 25 milliGRAM(s) Oral daily  polyethylene glycol 3350 17 Gram(s) Oral daily  senna 2 Tablet(s) Oral at bedtime  sodium bicarbonate 1300 milliGRAM(s) Oral three times a day          VITALS/PHYSICAL EXAM  --------------------------------------------------------------------------------  T(C): 36.1 (10-17-22 @ 05:14), Max: 36.6 (10-16-22 @ 12:34)  HR: 85 (10-17-22 @ 05:14) (85 - 93)  BP: 138/74 (10-17-22 @ 05:14) (114/54 - 146/67)  RR: 18 (10-17-22 @ 05:14) (18 - 19)  SpO2: --  Wt(kg): --        10-16-22 @ 07:01  -  10-17-22 @ 07:00  --------------------------------------------------------  IN: 0 mL / OUT: 700 mL / NET: -700 mL      Physical Exam:  	Gen: NAD  	Pulm: CTA B/L  	CV:  S1S2; no rub  	Abd:  soft, nontender/nondistended  	LE: chronic skin changes   	    LABS/STUDIES  --------------------------------------------------------------------------------              8.1    7.92  >-----------<  319      [10-16-22 @ 08:07]              25.3     143  |  109  |  52  ----------------------------<  100      [10-16-22 @ 08:07]  4.9   |  22  |  2.9        Ca     8.3     [10-16-22 @ 08:07]      Mg     1.4     [10-16-22 @ 08:07]    TPro  5.8  /  Alb  2.8  /  TBili  0.2  /  DBili  x   /  AST  21  /  ALT  19  /  AlkPhos  312  [10-16-22 @ 08:07]      Creatinine Trend:  SCr 2.9 [10-16 @ 08:07]  SCr 3.1 [10-15 @ 17:15]  SCr 3.1 [10-14 @ 22:16]  SCr 3.3 [10-14 @ 07:26]  SCr 3.2 [10-13 @ 18:34]    Urinalysis - [10-12-22 @ 16:45]      Color Yellow / Appearance Slightly Turbid / SG 1.011 / pH 6.0      Gluc Negative / Ketone Trace  / Bili Negative / Urobili <2 mg/dL       Blood Small / Protein 30 mg/dL / Leuk Est Large / Nitrite Negative      RBC 13 /  / Hyaline 2 / Gran  / Sq Epi  / Non Sq Epi 1 / Bacteria Many    Urine Creatinine 18      [10-13-22 @ 18:26]  Urine Protein 11      [10-13-22 @ 18:26]  Urine Sodium 128.0      [10-13-22 @ 18:30]  Urine Urea Nitrogen 194      [10-13-22 @ 18:26]  Urine Potassium 8      [10-13-22 @ 18:30]  Urine Osmolality 341      [10-13-22 @ 18:30]    Iron 29, TIBC 146, %sat 20      [10-15-22 @ 04:30]  Ferritin 328      [10-15-22 @ 04:30]  PTH -- (Ca 8.3)      [10-13-22 @ 18:34]   118  Lipid: chol 128, , HDL 49, LDL --      [10-13-22 @ 12:42]

## 2022-10-17 NOTE — DISCHARGE NOTE NURSING/CASE MANAGEMENT/SOCIAL WORK - NSDCFUADDAPPT_GEN_ALL_CORE_FT
APPTS ARE READY TO BE MADE: [X ] YES    Best Family or Patient Contact (if needed): Demario Padilla, Phone # [phone]    Additional Information about above appointments (if needed):    1: Please follow up with your Kidney doctor for water pill adjustment as needed  2: Please follow up with your primary care doctor to do CT chest in 6 weeks for lung changes follow up, then go to your pulmonologist  3:     Other comments or requests:

## 2022-10-17 NOTE — PROGRESS NOTE ADULT - SUBJECTIVE AND OBJECTIVE BOX
NEVIN SHELL  77y Male    INTERVAL HPI/OVERNIGHT EVENTS:    pt feels well   denies cough, chest pain, SOB, N/V, abdominal pain  no fever  c/o constipation  passing gas  ROS negative  LE edema improved from admission    T(F): 98.4 (10-17-22 @ 13:09), Max: 98.4 (10-17-22 @ 13:09)  HR: 87 (10-17-22 @ 13:09) (78 - 91)  BP: 132/59 (10-17-22 @ 13:09) (114/54 - 138/74)  RR: 18 (10-17-22 @ 13:09) (18 - 18)  SpO2: 96% (10-17-22 @ 09:46) (96% - 96%) on RA    I&O's Summary    16 Oct 2022 07:01  -  17 Oct 2022 07:00  --------------------------------------------------------  IN: 0 mL / OUT: 700 mL / NET: -700 mL    17 Oct 2022 07:01  -  17 Oct 2022 13:37  --------------------------------------------------------  IN: 0 mL / OUT: 200 mL / NET: -200 mL      CAPILLARY BLOOD GLUCOSE      POCT Blood Glucose.: 155 mg/dL (17 Oct 2022 11:58)  POCT Blood Glucose.: 117 mg/dL (17 Oct 2022 08:14)  POCT Blood Glucose.: 114 mg/dL (16 Oct 2022 20:49)  POCT Blood Glucose.: 141 mg/dL (16 Oct 2022 16:47)        PHYSICAL EXAM:  GENERAL: NAD  HEAD:  Normocephalic  EYES:  conjunctiva and sclera clear  ENMT: Moist mucous membranes  NERVOUS SYSTEM:  Alert, awake, Good concentration  CHEST/LUNG: crackles at bases, otherwise CTA  HEART: Regular rate and rhythm  ABDOMEN: Soft, Nontender, Nondistended  EXTREMITIES:  improved LE edema  SKIN: chronic venous stasis changes, no drainage noted    Consultant(s) Notes Reviewed:  [x ] YES  [ ] NO  Care Discussed with Consultants/Other Providers [ x] YES  [ ] NO    MEDICATIONS  (STANDING):  amLODIPine   Tablet 5 milliGRAM(s) Oral daily  furosemide    Tablet 40 milliGRAM(s) Oral daily  heparin   Injectable 5000 Unit(s) SubCutaneous every 8 hours  influenza  Vaccine (HIGH DOSE) 0.7 milliLiter(s) IntraMuscular once  iron sucrose IVPB 200 milliGRAM(s) IV Intermittent every 24 hours  lactulose Syrup 20 Gram(s) Oral two times a day  metoprolol succinate ER 25 milliGRAM(s) Oral daily  polyethylene glycol 3350 17 Gram(s) Oral two times a day  senna 2 Tablet(s) Oral at bedtime  sodium bicarbonate 1300 milliGRAM(s) Oral three times a day    MEDICATIONS  (PRN):      LABS:                        8.0    9.14  )-----------( 301      ( 17 Oct 2022 07:20 )             24.8     10-17    140  |  105  |  52<H>  ----------------------------<  114<H>  5.4<H>   |  24  |  2.9<H>    Ca    8.1<L>      17 Oct 2022 07:20  Mg     1.7     10-17    TPro  5.7<L>  /  Alb  3.1<L>  /  TBili  0.2  /  DBili  x   /  AST  23  /  ALT  19  /  AlkPhos  312<H>  10-17          RADIOLOGY & ADDITIONAL TESTS:    Imaging or report Personally Reviewed:  [x ] YES  [ ] NO    < from: CT Chest No Cont (10.13.22 @ 09:34) >  IMPRESSION:    Triangular/wedge-shaped opacity in the left upper lobe, likely   corresponding to abnormality on recent chest radiograph which is   nonspecific and of unclear etiology. Differential is broad and includes   pneumonia versus atelectasis/scarring or loculated fluid. Follow-up   posttreatment is needed to ensure no underlying mass.    Trace left pleural effusion and additional basilar/dependent opacities   likely on the basis of atelectasis.    Two left-sided rib fractures demonstrate minimal callus formation,   possibly subacute fractures. No pneumothorax.    Partially imaged, nonspecific fluid or fat inflammation in the anterior   right upper quadrant. Recommend CT abdomen for further evaluation.    < end of copied text >      < from: US Kidney and Bladder (10.13.22 @ 04:25) >  IMPRESSION:    Somewhat thinned left renal parenchyma, otherwise unremarkable study.    < end of copied text >      < from: [facility] Duplex Lower Ext Vein Scan, Bilat (10.12.22 @ 15:56) >  Impression:    No evidence of deep venous thrombosis or superficial thrombophlebitis in   the bilateral lower extremities.    Unable to visualize calf veins due to edema.      < end of copied text >      < from: TTE Echo Complete w/o Contrast w/ Doppler (10.13.22 @ 07:06) >    Summary:   1. Left ventricular ejection fraction, byvisual estimation, is 70 to   75%.   2. Hyperdynamic global left ventricular systolic function.   3. Mildly increased LV wall thickness.   4. Mild mitral valve regurgitation.   5. Mild thickening and calcification of the anterior and posterior   mitral valve leaflets.   6. Moderate mitral annular calcification.   7. Dilatation of the ascending aorta at 4.2 cm.   8. Sclerotic aortic valve with normal opening.   9. Mild pulmonic valve regurgitation.  10. Spectral Doppler shows impaired relaxation pattern of left   ventricular myocardial filling (Grade I diastolic dysfunction).    < end of copied text >        Case discussed with residents and RN on rounds today    Care discussed with pt

## 2022-10-20 LAB
% ALBUMIN: 45 % — SIGNIFICANT CHANGE UP
% ALPHA 1: 8.9 % — SIGNIFICANT CHANGE UP
% ALPHA 2: 15.1 % — SIGNIFICANT CHANGE UP
% BETA: 11.7 % — SIGNIFICANT CHANGE UP
% GAMMA: 19.3 % — SIGNIFICANT CHANGE UP
ALBUMIN SERPL ELPH-MCNC: 2.6 G/DL — LOW (ref 3.6–5.5)
ALBUMIN/GLOB SERPL ELPH: 0.8 RATIO — SIGNIFICANT CHANGE UP
ALPHA1 GLOB SERPL ELPH-MCNC: 0.5 G/DL — HIGH (ref 0.1–0.4)
ALPHA2 GLOB SERPL ELPH-MCNC: 0.9 G/DL — SIGNIFICANT CHANGE UP (ref 0.5–1)
B-GLOBULIN SERPL ELPH-MCNC: 0.7 G/DL — SIGNIFICANT CHANGE UP (ref 0.5–1)
GAMMA GLOBULIN: 1.1 G/DL — SIGNIFICANT CHANGE UP (ref 0.6–1.6)
INTERPRETATION SERPL IFE-IMP: SIGNIFICANT CHANGE UP
PROT PATTERN SERPL ELPH-IMP: SIGNIFICANT CHANGE UP

## 2022-10-20 NOTE — CHART NOTE - NSCHARTNOTEFT_GEN_A_CORE
Left 1 message for patient in regards to follow up care with callback information 10/18/22
Left 1 message for patient in regards to follow up care with callback information.
Left message for patient in regards to follow up care with callback information 10/19/22.

## 2022-10-22 DIAGNOSIS — N18.4 CHRONIC KIDNEY DISEASE, STAGE 4 (SEVERE): ICD-10-CM

## 2022-10-22 DIAGNOSIS — L89.326 PRESSURE-INDUCED DEEP TISSUE DAMAGE OF LEFT BUTTOCK: ICD-10-CM

## 2022-10-22 DIAGNOSIS — L89.896 PRESSURE-INDUCED DEEP TISSUE DAMAGE OF OTHER SITE: ICD-10-CM

## 2022-10-22 DIAGNOSIS — I13.0 HYPERTENSIVE HEART AND CHRONIC KIDNEY DISEASE WITH HEART FAILURE AND STAGE 1 THROUGH STAGE 4 CHRONIC KIDNEY DISEASE, OR UNSPECIFIED CHRONIC KIDNEY DISEASE: ICD-10-CM

## 2022-10-22 DIAGNOSIS — L89.151 PRESSURE ULCER OF SACRAL REGION, STAGE 1: ICD-10-CM

## 2022-10-22 DIAGNOSIS — N17.9 ACUTE KIDNEY FAILURE, UNSPECIFIED: ICD-10-CM

## 2022-10-22 DIAGNOSIS — E83.42 HYPOMAGNESEMIA: ICD-10-CM

## 2022-10-22 DIAGNOSIS — B35.1 TINEA UNGUIUM: ICD-10-CM

## 2022-10-22 DIAGNOSIS — K59.00 CONSTIPATION, UNSPECIFIED: ICD-10-CM

## 2022-10-22 DIAGNOSIS — J98.11 ATELECTASIS: ICD-10-CM

## 2022-10-22 DIAGNOSIS — I89.0 LYMPHEDEMA, NOT ELSEWHERE CLASSIFIED: ICD-10-CM

## 2022-10-22 DIAGNOSIS — D63.1 ANEMIA IN CHRONIC KIDNEY DISEASE: ICD-10-CM

## 2022-10-22 DIAGNOSIS — L97.819 NON-PRESSURE CHRONIC ULCER OF OTHER PART OF RIGHT LOWER LEG WITH UNSPECIFIED SEVERITY: ICD-10-CM

## 2022-10-22 DIAGNOSIS — Z87.891 PERSONAL HISTORY OF NICOTINE DEPENDENCE: ICD-10-CM

## 2022-10-22 DIAGNOSIS — E87.5 HYPERKALEMIA: ICD-10-CM

## 2022-10-22 DIAGNOSIS — I50.33 ACUTE ON CHRONIC DIASTOLIC (CONGESTIVE) HEART FAILURE: ICD-10-CM

## 2022-10-22 DIAGNOSIS — Z20.822 CONTACT WITH AND (SUSPECTED) EXPOSURE TO COVID-19: ICD-10-CM

## 2022-10-22 DIAGNOSIS — E87.20 ACIDOSIS, UNSPECIFIED: ICD-10-CM

## 2022-10-22 DIAGNOSIS — R82.81 PYURIA: ICD-10-CM

## 2022-10-22 DIAGNOSIS — R77.8 OTHER SPECIFIED ABNORMALITIES OF PLASMA PROTEINS: ICD-10-CM

## 2022-10-22 DIAGNOSIS — I77.810 THORACIC AORTIC ECTASIA: ICD-10-CM

## 2022-10-22 DIAGNOSIS — E11.22 TYPE 2 DIABETES MELLITUS WITH DIABETIC CHRONIC KIDNEY DISEASE: ICD-10-CM

## 2022-10-22 DIAGNOSIS — I87.2 VENOUS INSUFFICIENCY (CHRONIC) (PERIPHERAL): ICD-10-CM

## 2022-10-27 ENCOUNTER — INPATIENT (INPATIENT)
Facility: HOSPITAL | Age: 77
LOS: 4 days | Discharge: NURSING, REHAB OR LONG-TERM CARE | End: 2022-11-01
Attending: HOSPITALIST | Admitting: INTERNAL MEDICINE

## 2022-10-27 VITALS
HEART RATE: 18 BPM | TEMPERATURE: 98 F | OXYGEN SATURATION: 98 % | SYSTOLIC BLOOD PRESSURE: 141 MMHG | RESPIRATION RATE: 20 BRPM | DIASTOLIC BLOOD PRESSURE: 76 MMHG

## 2022-10-27 LAB
BASOPHILS # BLD AUTO: 0.1 K/UL — SIGNIFICANT CHANGE UP (ref 0–0.2)
BASOPHILS NFR BLD AUTO: 1 % — SIGNIFICANT CHANGE UP (ref 0–1)
EOSINOPHIL # BLD AUTO: 0.09 K/UL — SIGNIFICANT CHANGE UP (ref 0–0.7)
EOSINOPHIL NFR BLD AUTO: 0.9 % — SIGNIFICANT CHANGE UP (ref 0–8)
HCT VFR BLD CALC: 28.1 % — LOW (ref 42–52)
HGB BLD-MCNC: 9.1 G/DL — LOW (ref 14–18)
IMM GRANULOCYTES NFR BLD AUTO: 0.6 % — HIGH (ref 0.1–0.3)
LYMPHOCYTES # BLD AUTO: 0.94 K/UL — LOW (ref 1.2–3.4)
LYMPHOCYTES # BLD AUTO: 9.7 % — LOW (ref 20.5–51.1)
MCHC RBC-ENTMCNC: 28.7 PG — SIGNIFICANT CHANGE UP (ref 27–31)
MCHC RBC-ENTMCNC: 32.4 G/DL — SIGNIFICANT CHANGE UP (ref 32–37)
MCV RBC AUTO: 88.6 FL — SIGNIFICANT CHANGE UP (ref 80–94)
MONOCYTES # BLD AUTO: 0.5 K/UL — SIGNIFICANT CHANGE UP (ref 0.1–0.6)
MONOCYTES NFR BLD AUTO: 5.1 % — SIGNIFICANT CHANGE UP (ref 1.7–9.3)
NEUTROPHILS # BLD AUTO: 8.03 K/UL — HIGH (ref 1.4–6.5)
NEUTROPHILS NFR BLD AUTO: 82.7 % — HIGH (ref 42.2–75.2)
NRBC # BLD: 0 /100 WBCS — SIGNIFICANT CHANGE UP (ref 0–0)
PLATELET # BLD AUTO: 412 K/UL — HIGH (ref 130–400)
RBC # BLD: 3.17 M/UL — LOW (ref 4.7–6.1)
RBC # FLD: 15.3 % — HIGH (ref 11.5–14.5)
WBC # BLD: 9.72 K/UL — SIGNIFICANT CHANGE UP (ref 4.8–10.8)
WBC # FLD AUTO: 9.72 K/UL — SIGNIFICANT CHANGE UP (ref 4.8–10.8)

## 2022-10-27 PROCEDURE — 99285 EMERGENCY DEPT VISIT HI MDM: CPT

## 2022-10-27 PROCEDURE — 93010 ELECTROCARDIOGRAM REPORT: CPT

## 2022-10-27 NOTE — ED CLERICAL - NS ED CLERK NOTE PRE-ARRIVAL INFORMATION; ADDITIONAL PRE-ARRIVAL INFORMATION
This patient is enrolled in the STARS readmission reduction initiative and has active care navigation. This patient can be followed up by the care navigation team within 24 hours.  To arrange close follow-up or to obtain additional clinical information, please call the contact number above.     The on call [facility] Hospitalist has been notified and will coordinate care in concert with the ED Physician including consults as necessary. Call [phone] (North), [phone] (South) to reach the hospitalist. You may also call the Hospitalist Division at  at either site.     Consider CDU for management per guideline

## 2022-10-27 NOTE — ED PROVIDER NOTE - CLINICAL SUMMARY MEDICAL DECISION MAKING FREE TEXT BOX
Pt presented after a syncopal episode. Required ekg, labs, imaging. No acute findings. Will admit for further workup.

## 2022-10-27 NOTE — ED ADULT TRIAGE NOTE - CHIEF COMPLAINT QUOTE
ANGIE from [facility] home for syncope while walking, patient denies hitting his head, denies anticoagulation, patient states no pain at this time, patient states has Hx of syncope ANGIE from [facility] home for syncope while walking, patient denies hitting his head, denies anticoagulation, patient states no pain at this time,  patient states has Hx of syncope

## 2022-10-27 NOTE — ED PROVIDER NOTE - ATTENDING CONTRIBUTION TO CARE
I personally evaluated the patient. I reviewed the Resident’s or Physician Assistant’s note (as assigned above), and agree with the findings and plan except as documented in my note.  Pt from [facility] presents after a syncopal episode. Denies SOB, CP before or after the incident. No AC use. VS reviewed, pt non-toxic appearing, NAD. Head ncat, MMM, pharyngeal exam w/o erythema, edema or exudates. B/l TM wnl. neck supple, normal ROM, normal s1s2 without any murmurs, Lungs CTAB with normal work of breathing. abd +BS, s/nd/nt, extremities wnl, GCS 15, CN II to XII wnl, normal motor, sensation and cerebellar exams. No acute skin rashes.

## 2022-10-27 NOTE — ED PROVIDER NOTE - PHYSICAL EXAMINATION
CONSTITUTIONAL:  in no acute distress  SKIN: warm, dry  HEAD: Normocephalic atraumatic  EYES: no conjunctival erythema eomi peerla 3mm b/l  ENT: no nasal discharge, airway clear  NECK: full ROM, non-tender  CARD: regular rate and rhythm  RESP: normal respiratory effort, no wheezes, rales or rhonchi  ABD: soft, non-distended, non-tender  EXT: moving all extremities spontaneously  NEURO: alert and oriented x3 strength and sensation 5/5 b/l UE and LE, CN2-12 intact, finger to nose normal b/l  PSYCH: cooperative, appropriate

## 2022-10-27 NOTE — ED PROVIDER NOTE - OBJECTIVE STATEMENT
77 year old male with a history of DM, HTN, CKD and HFpEF, chronic LE edema presented to the ED for syncopal episode at [facility]. Pt was sitting on his wheelchair and doesn't recall when he passed out, but says it was around 5:30pm Pt woke up at around 9pm with no recollection of what happened. Pt did not fall or hit his head, did not have incontinence or bit his tongue. Upon chart review, pt was recently admitted for LE edema and JONH was discharged to [facility] for Rehab. Denies chest pain sob ha n v fever chills abd pain back pain. He stated that over past few weeks he's been having increased episodes of syncopal events, at least 3x. he states he does not see or have a cardiologist. denies post-ictal state shaking tongue biting or incontinence.

## 2022-10-27 NOTE — ED ADULT NURSE NOTE - CHIEF COMPLAINT QUOTE
ANGIE from [facility] home for syncope while walking, patient denies hitting his head, denies anticoagulation, patient states no pain at this time,  patient states has Hx of syncope

## 2022-10-27 NOTE — ED PROVIDER NOTE - NS ED ROS FT
Constitutional: No fever   Eyes:  No visual changes  Ears:  No hearing changes  Neck: No neck pain  Cardiac:  No chest pain +syncope  Respiratory:  No SOB   GI:  No abdominal pain, nausea, or vomiting  :  No dysuria  MS:  No back pain  Neuro:  No headache or weakness.  No LOC  Skin:  No skin rash

## 2022-10-28 PROBLEM — I10 ESSENTIAL (PRIMARY) HYPERTENSION: Chronic | Status: ACTIVE | Noted: 2022-10-12

## 2022-10-28 PROBLEM — E11.9 TYPE 2 DIABETES MELLITUS WITHOUT COMPLICATIONS: Chronic | Status: ACTIVE | Noted: 2022-10-12

## 2022-10-28 PROBLEM — N18.9 CHRONIC KIDNEY DISEASE, UNSPECIFIED: Chronic | Status: ACTIVE | Noted: 2022-10-12

## 2022-10-28 LAB
ALBUMIN SERPL ELPH-MCNC: 3.1 G/DL — LOW (ref 3.5–5.2)
ALBUMIN SERPL ELPH-MCNC: 3.5 G/DL — SIGNIFICANT CHANGE UP (ref 3.5–5.2)
ALP SERPL-CCNC: 269 U/L — HIGH (ref 30–115)
ALP SERPL-CCNC: 307 U/L — HIGH (ref 30–115)
ALT FLD-CCNC: 20 U/L — SIGNIFICANT CHANGE UP (ref 0–41)
ALT FLD-CCNC: 26 U/L — SIGNIFICANT CHANGE UP (ref 0–41)
ANION GAP SERPL CALC-SCNC: 14 MMOL/L — SIGNIFICANT CHANGE UP (ref 7–14)
ANION GAP SERPL CALC-SCNC: 16 MMOL/L — HIGH (ref 7–14)
APPEARANCE UR: CLEAR — SIGNIFICANT CHANGE UP
AST SERPL-CCNC: 20 U/L — SIGNIFICANT CHANGE UP (ref 0–41)
AST SERPL-CCNC: 36 U/L — SIGNIFICANT CHANGE UP (ref 0–41)
BACTERIA # UR AUTO: ABNORMAL
BASE EXCESS BLDV CALC-SCNC: 0.3 MMOL/L — SIGNIFICANT CHANGE UP (ref -2–3)
BASOPHILS # BLD AUTO: 0.09 K/UL — SIGNIFICANT CHANGE UP (ref 0–0.2)
BASOPHILS NFR BLD AUTO: 1.1 % — HIGH (ref 0–1)
BILIRUB SERPL-MCNC: 0.2 MG/DL — SIGNIFICANT CHANGE UP (ref 0.2–1.2)
BILIRUB SERPL-MCNC: 0.2 MG/DL — SIGNIFICANT CHANGE UP (ref 0.2–1.2)
BILIRUB UR-MCNC: NEGATIVE — SIGNIFICANT CHANGE UP
BUN SERPL-MCNC: 47 MG/DL — HIGH (ref 10–20)
BUN SERPL-MCNC: 49 MG/DL — HIGH (ref 10–20)
CA-I SERPL-SCNC: 1.15 MMOL/L — SIGNIFICANT CHANGE UP (ref 1.15–1.33)
CALCIUM SERPL-MCNC: 8.5 MG/DL — SIGNIFICANT CHANGE UP (ref 8.4–10.5)
CALCIUM SERPL-MCNC: 9 MG/DL — SIGNIFICANT CHANGE UP (ref 8.4–10.5)
CHLORIDE SERPL-SCNC: 101 MMOL/L — SIGNIFICANT CHANGE UP (ref 98–110)
CHLORIDE SERPL-SCNC: 109 MMOL/L — SIGNIFICANT CHANGE UP (ref 98–110)
CO2 SERPL-SCNC: 24 MMOL/L — SIGNIFICANT CHANGE UP (ref 17–32)
CO2 SERPL-SCNC: 26 MMOL/L — SIGNIFICANT CHANGE UP (ref 17–32)
COLOR SPEC: YELLOW — SIGNIFICANT CHANGE UP
CREAT SERPL-MCNC: 2.9 MG/DL — HIGH (ref 0.7–1.5)
CREAT SERPL-MCNC: 3.1 MG/DL — HIGH (ref 0.7–1.5)
DIFF PNL FLD: ABNORMAL
EGFR: 20 ML/MIN/1.73M2 — LOW
EGFR: 21 ML/MIN/1.73M2 — LOW
EOSINOPHIL # BLD AUTO: 0.23 K/UL — SIGNIFICANT CHANGE UP (ref 0–0.7)
EOSINOPHIL NFR BLD AUTO: 2.9 % — SIGNIFICANT CHANGE UP (ref 0–8)
EPI CELLS # UR: 1 /HPF — SIGNIFICANT CHANGE UP (ref 0–5)
FOLATE SERPL-MCNC: 3.8 NG/ML — LOW
GAS PNL BLDV: 138 MMOL/L — SIGNIFICANT CHANGE UP (ref 136–145)
GAS PNL BLDV: SIGNIFICANT CHANGE UP
GLUCOSE SERPL-MCNC: 107 MG/DL — HIGH (ref 70–99)
GLUCOSE SERPL-MCNC: 80 MG/DL — SIGNIFICANT CHANGE UP (ref 70–99)
GLUCOSE UR QL: NEGATIVE — SIGNIFICANT CHANGE UP
HCO3 BLDV-SCNC: 25 MMOL/L — SIGNIFICANT CHANGE UP (ref 22–29)
HCT VFR BLD CALC: 25.6 % — LOW (ref 42–52)
HCT VFR BLDA CALC: 27 % — LOW (ref 39–51)
HGB BLD CALC-MCNC: 9.1 G/DL — LOW (ref 12.6–17.4)
HGB BLD-MCNC: 8.2 G/DL — LOW (ref 14–18)
HYALINE CASTS # UR AUTO: 5 /LPF — SIGNIFICANT CHANGE UP (ref 0–7)
IMM GRANULOCYTES NFR BLD AUTO: 0.5 % — HIGH (ref 0.1–0.3)
KETONES UR-MCNC: NEGATIVE — SIGNIFICANT CHANGE UP
LACTATE BLDV-MCNC: 0.8 MMOL/L — SIGNIFICANT CHANGE UP (ref 0.5–2)
LEUKOCYTE ESTERASE UR-ACNC: ABNORMAL
LYMPHOCYTES # BLD AUTO: 1.81 K/UL — SIGNIFICANT CHANGE UP (ref 1.2–3.4)
LYMPHOCYTES # BLD AUTO: 23 % — SIGNIFICANT CHANGE UP (ref 20.5–51.1)
MAGNESIUM SERPL-MCNC: 1.6 MG/DL — LOW (ref 1.8–2.4)
MAGNESIUM SERPL-MCNC: 1.7 MG/DL — LOW (ref 1.8–2.4)
MCHC RBC-ENTMCNC: 28.3 PG — SIGNIFICANT CHANGE UP (ref 27–31)
MCHC RBC-ENTMCNC: 32 G/DL — SIGNIFICANT CHANGE UP (ref 32–37)
MCV RBC AUTO: 88.3 FL — SIGNIFICANT CHANGE UP (ref 80–94)
MONOCYTES # BLD AUTO: 0.61 K/UL — HIGH (ref 0.1–0.6)
MONOCYTES NFR BLD AUTO: 7.7 % — SIGNIFICANT CHANGE UP (ref 1.7–9.3)
NEUTROPHILS # BLD AUTO: 5.1 K/UL — SIGNIFICANT CHANGE UP (ref 1.4–6.5)
NEUTROPHILS NFR BLD AUTO: 64.8 % — SIGNIFICANT CHANGE UP (ref 42.2–75.2)
NITRITE UR-MCNC: POSITIVE
NRBC # BLD: 0 /100 WBCS — SIGNIFICANT CHANGE UP (ref 0–0)
PCO2 BLDV: 42 MMHG — SIGNIFICANT CHANGE UP (ref 42–55)
PH BLDV: 7.39 — SIGNIFICANT CHANGE UP (ref 7.32–7.43)
PH UR: 7.5 — SIGNIFICANT CHANGE UP (ref 5–8)
PLATELET # BLD AUTO: 401 K/UL — HIGH (ref 130–400)
PO2 BLDV: 58 MMHG — SIGNIFICANT CHANGE UP
POTASSIUM BLDV-SCNC: 4.5 MMOL/L — SIGNIFICANT CHANGE UP (ref 3.5–5.1)
POTASSIUM SERPL-MCNC: 5 MMOL/L — SIGNIFICANT CHANGE UP (ref 3.5–5)
POTASSIUM SERPL-MCNC: 6.1 MMOL/L — CRITICAL HIGH (ref 3.5–5)
POTASSIUM SERPL-SCNC: 5 MMOL/L — SIGNIFICANT CHANGE UP (ref 3.5–5)
POTASSIUM SERPL-SCNC: 6.1 MMOL/L — CRITICAL HIGH (ref 3.5–5)
PROT SERPL-MCNC: 5.9 G/DL — LOW (ref 6–8)
PROT SERPL-MCNC: 7.2 G/DL — SIGNIFICANT CHANGE UP (ref 6–8)
PROT UR-MCNC: ABNORMAL
RBC # BLD: 2.9 M/UL — LOW (ref 4.7–6.1)
RBC # FLD: 15.2 % — HIGH (ref 11.5–14.5)
RBC CASTS # UR COMP ASSIST: 2 /HPF — SIGNIFICANT CHANGE UP (ref 0–4)
SAO2 % BLDV: 89.3 % — SIGNIFICANT CHANGE UP
SARS-COV-2 RNA SPEC QL NAA+PROBE: SIGNIFICANT CHANGE UP
SODIUM SERPL-SCNC: 141 MMOL/L — SIGNIFICANT CHANGE UP (ref 135–146)
SODIUM SERPL-SCNC: 149 MMOL/L — HIGH (ref 135–146)
SP GR SPEC: 1.01 — SIGNIFICANT CHANGE UP (ref 1.01–1.03)
TROPONIN T SERPL-MCNC: 0.08 NG/ML — CRITICAL HIGH
TROPONIN T SERPL-MCNC: 0.09 NG/ML — CRITICAL HIGH
TSH SERPL-MCNC: 4.18 UIU/ML — SIGNIFICANT CHANGE UP (ref 0.27–4.2)
UROBILINOGEN FLD QL: SIGNIFICANT CHANGE UP
VIT B12 SERPL-MCNC: 421 PG/ML — SIGNIFICANT CHANGE UP (ref 232–1245)
WBC # BLD: 7.88 K/UL — SIGNIFICANT CHANGE UP (ref 4.8–10.8)
WBC # FLD AUTO: 7.88 K/UL — SIGNIFICANT CHANGE UP (ref 4.8–10.8)
WBC UR QL: 92 /HPF — HIGH (ref 0–5)

## 2022-10-28 PROCEDURE — 93010 ELECTROCARDIOGRAM REPORT: CPT

## 2022-10-28 PROCEDURE — 99223 1ST HOSP IP/OBS HIGH 75: CPT

## 2022-10-28 PROCEDURE — 71045 X-RAY EXAM CHEST 1 VIEW: CPT | Mod: 26

## 2022-10-28 PROCEDURE — 70450 CT HEAD/BRAIN W/O DYE: CPT | Mod: 26

## 2022-10-28 RX ORDER — AMLODIPINE BESYLATE 2.5 MG/1
5 TABLET ORAL DAILY
Refills: 0 | Status: DISCONTINUED | OUTPATIENT
Start: 2022-10-28 | End: 2022-10-28

## 2022-10-28 RX ORDER — MAGNESIUM SULFATE 500 MG/ML
2 VIAL (ML) INJECTION ONCE
Refills: 0 | Status: COMPLETED | OUTPATIENT
Start: 2022-10-28 | End: 2022-10-28

## 2022-10-28 RX ORDER — CEFTRIAXONE 500 MG/1
1000 INJECTION, POWDER, FOR SOLUTION INTRAMUSCULAR; INTRAVENOUS ONCE
Refills: 0 | Status: COMPLETED | OUTPATIENT
Start: 2022-10-28 | End: 2022-10-28

## 2022-10-28 RX ORDER — ACETAMINOPHEN 500 MG
650 TABLET ORAL EVERY 6 HOURS
Refills: 0 | Status: DISCONTINUED | OUTPATIENT
Start: 2022-10-28 | End: 2022-11-01

## 2022-10-28 RX ORDER — CEFTRIAXONE 500 MG/1
INJECTION, POWDER, FOR SOLUTION INTRAMUSCULAR; INTRAVENOUS
Refills: 0 | Status: DISCONTINUED | OUTPATIENT
Start: 2022-10-28 | End: 2022-10-29

## 2022-10-28 RX ORDER — HEPARIN SODIUM 5000 [USP'U]/ML
5000 INJECTION INTRAVENOUS; SUBCUTANEOUS EVERY 12 HOURS
Refills: 0 | Status: DISCONTINUED | OUTPATIENT
Start: 2022-10-28 | End: 2022-11-01

## 2022-10-28 RX ORDER — SODIUM BICARBONATE 1 MEQ/ML
650 SYRINGE (ML) INTRAVENOUS EVERY 8 HOURS
Refills: 0 | Status: DISCONTINUED | OUTPATIENT
Start: 2022-10-28 | End: 2022-11-01

## 2022-10-28 RX ORDER — FOLIC ACID 0.8 MG
1 TABLET ORAL DAILY
Refills: 0 | Status: DISCONTINUED | OUTPATIENT
Start: 2022-10-28 | End: 2022-11-01

## 2022-10-28 RX ORDER — CEFTRIAXONE 500 MG/1
1000 INJECTION, POWDER, FOR SOLUTION INTRAMUSCULAR; INTRAVENOUS EVERY 24 HOURS
Refills: 0 | Status: DISCONTINUED | OUTPATIENT
Start: 2022-10-29 | End: 2022-10-29

## 2022-10-28 RX ORDER — METOPROLOL TARTRATE 50 MG
25 TABLET ORAL DAILY
Refills: 0 | Status: DISCONTINUED | OUTPATIENT
Start: 2022-10-28 | End: 2022-11-01

## 2022-10-28 RX ORDER — FUROSEMIDE 40 MG
40 TABLET ORAL DAILY
Refills: 0 | Status: DISCONTINUED | OUTPATIENT
Start: 2022-10-28 | End: 2022-10-29

## 2022-10-28 RX ORDER — SODIUM CHLORIDE 9 MG/ML
1000 INJECTION INTRAMUSCULAR; INTRAVENOUS; SUBCUTANEOUS ONCE
Refills: 0 | Status: COMPLETED | OUTPATIENT
Start: 2022-10-28 | End: 2022-10-28

## 2022-10-28 RX ADMIN — Medication 12.5 GRAM(S): at 02:01

## 2022-10-28 RX ADMIN — Medication 1 MILLIGRAM(S): at 16:54

## 2022-10-28 RX ADMIN — Medication 650 MILLIGRAM(S): at 16:54

## 2022-10-28 RX ADMIN — Medication 25 GRAM(S): at 10:06

## 2022-10-28 RX ADMIN — CEFTRIAXONE 100 MILLIGRAM(S): 500 INJECTION, POWDER, FOR SOLUTION INTRAMUSCULAR; INTRAVENOUS at 10:39

## 2022-10-28 RX ADMIN — HEPARIN SODIUM 5000 UNIT(S): 5000 INJECTION INTRAVENOUS; SUBCUTANEOUS at 07:12

## 2022-10-28 RX ADMIN — Medication 650 MILLIGRAM(S): at 05:40

## 2022-10-28 RX ADMIN — Medication 1 APPLICATION(S): at 16:55

## 2022-10-28 RX ADMIN — SODIUM CHLORIDE 1000 MILLILITER(S): 9 INJECTION INTRAMUSCULAR; INTRAVENOUS; SUBCUTANEOUS at 04:29

## 2022-10-28 RX ADMIN — AMLODIPINE BESYLATE 5 MILLIGRAM(S): 2.5 TABLET ORAL at 05:40

## 2022-10-28 RX ADMIN — Medication 25 MILLIGRAM(S): at 05:40

## 2022-10-28 RX ADMIN — HEPARIN SODIUM 5000 UNIT(S): 5000 INJECTION INTRAVENOUS; SUBCUTANEOUS at 17:00

## 2022-10-28 RX ADMIN — Medication 40 MILLIGRAM(S): at 05:40

## 2022-10-28 NOTE — H&P ADULT - NSHPPHYSICALEXAM_GEN_ALL_CORE
GENERAL: NAD, lying in bed comfortably  HEAD:  Atraumatic, Normocephalic  EYES: EOMI, PERRLA, conjunctiva and sclera clear  ENT: Moist mucous membranes  NECK: Supple, No JVD  CHEST/LUNG: Clear to auscultation bilaterally; No rales, rhonchi, wheezing, or rubs. Unlabored respirations  HEART: Regular rate and rhythm; No murmurs, rubs, or gallops  ABDOMEN: BSx4; Soft, nontender, nondistended  EXTREMITIES:  3+ edema bilat with ulcers noted on the shin  NERVOUS SYSTEM:  A&Ox3, no focal deficits

## 2022-10-28 NOTE — H&P ADULT - ASSESSMENT
77 year old male with a history of DM, HTN, CKD and HFpEF, chronic LE edema presented to the ED for syncopal episode in the nursing home.    #Syncope  - r/o other causes for loc (seizure (unlikely) / tia / metabolic)  - CT head unremarkable  - EKG: Accelerated Junctional rhythm  - Xray chest: no sig change since prior  - Admit tele   - check monitor for arrhythmic events  - Check orthostatic  - echo from 10.13.22: EF 70 to 75%, Hyperdynamic global left ventricular systolic function, Dilatation of the ascending aorta at 4.2 cm., grade 1 DD  - consider repeat echo  - fup eeg  - consider cardio eval for loop if unknown reasons    # CKD Stage 4  # Hyperkalemia   # Anemia of chronic disease   - baseline around 3  - C/w Lasix   - Cont sodium bicarb    # TAA  - Echo 10/13: 4.2cm dilatation of ascending aorta  - Will need outpatient f/u    # Hx of DM2?  - last a1c of 5.9  - Start ISS if FS > 180    # HTN  - Cont amlodipine 5mg qD, BB    GI prophylaxis: none   DVT PPX, heparin q 12  Ambulate as tolerated  Full code   DASH/ TLC

## 2022-10-28 NOTE — H&P ADULT - NSHPLABSRESULTS_GEN_ALL_CORE
LABS:                          9.1    9.72  )-----------( 412      ( 27 Oct 2022 22:35 )             28.1     10-27    141  |  101  |  49<H>  ----------------------------<  107<H>  6.1<HH>   |  24  |  3.1<H>    Ca    9.0      27 Oct 2022 22:35  Mg     1.7     10-27    TPro  7.2  /  Alb  3.5  /  TBili  0.2  /  DBili  x   /  AST  36  /  ALT  26  /  AlkPhos  307<H>  10-27

## 2022-10-28 NOTE — H&P ADULT - ATTENDING COMMENTS
Patient seen and examined. Case discussed with resident team    # syncopal episode - possibly orthostatic related  CT head- neg  EKG- sinus rhythm; no acute ST- T changes; tropsX2 0.08-->0.09; (stable at baseline); trend troponin  patient denies any chest pain or SOB  orthostatic vitals positive  monitor in tele  - echo from 10/13- reviewed  - f/u EEG results  - stop amlodipine; monitor BP  - continue coreg and lasix for lower extremity edema    # CKD -stage 4 stable; monitor    # anemia- normocytic- hg stable  # folate deficiency  start replacement    # Ascending aorta dilation- follow up as OP     # DM- A1c-5.9; controlled; monitor blood sugar    # HTN Patient seen and examined. Case discussed with resident team    # syncopal episode - possibly orthostatic related  CT head- neg  EKG- sinus rhythm; no acute ST- T changes; tropsX2 0.08-->0.09; (stable at baseline); trend troponin  patient denies any chest pain or SOB  orthostatic vitals positive  monitor in tele  - echo from 10/13- reviewed  - f/u EEG results  - stop amlodipine; monitor BP  - continue metoprolol ER 25 mg daily and lasix for lower extremity edema    # CKD -stage 4 stable; monitor    # anemia- normocytic- hg stable  # folate deficiency  contnue folic acid  monitor hg    # hypomagnesemia- replace and monitor    # Ascending aorta dilation- follow up as OP     # DM- A1c-5.9; controlled; monitor blood sugar    # HTN- hold amlodipine,   continue metoprolol, lasix  monitor orthostatic BP    Pending: EEG results, monitoring orthostatic BP, tele monitoring

## 2022-10-28 NOTE — H&P ADULT - HISTORY OF PRESENT ILLNESS
77 year old male with a history of DM, HTN, CKD and HFpEF, chronic LE edema presented to the ED for syncopal episode in the nursing home. Pt was sitting on his wheelchair and doesnt recall when he passed out, but says it was around 5:30pm Pt woke up at around 9pm with no recollection of what happened. Pt did not fall or hit his head, did not have incontinence or bit his tongue.     Pt was recently admitted for LE edema and JONH was discharged to [facility] for Rehab    ED vitals:   /76, RR 20, Temp 98.2, O2 sat 98% on RA    Sig labs:   Hg 9.1, Cr. 3.1    EKG: Accelerated Junctional rhythm    CT head neg    Xray chest: no sig change since prior

## 2022-10-29 LAB
ALBUMIN SERPL ELPH-MCNC: 3.3 G/DL — LOW (ref 3.5–5.2)
ALP SERPL-CCNC: 266 U/L — HIGH (ref 30–115)
ALT FLD-CCNC: 18 U/L — SIGNIFICANT CHANGE UP (ref 0–41)
ANION GAP SERPL CALC-SCNC: 14 MMOL/L — SIGNIFICANT CHANGE UP (ref 7–14)
AST SERPL-CCNC: 17 U/L — SIGNIFICANT CHANGE UP (ref 0–41)
BASOPHILS # BLD AUTO: 0.11 K/UL — SIGNIFICANT CHANGE UP (ref 0–0.2)
BASOPHILS NFR BLD AUTO: 1.5 % — HIGH (ref 0–1)
BILIRUB SERPL-MCNC: <0.2 MG/DL — SIGNIFICANT CHANGE UP (ref 0.2–1.2)
BLD GP AB SCN SERPL QL: SIGNIFICANT CHANGE UP
BUN SERPL-MCNC: 49 MG/DL — HIGH (ref 10–20)
CALCIUM SERPL-MCNC: 8.6 MG/DL — SIGNIFICANT CHANGE UP (ref 8.4–10.5)
CHLORIDE SERPL-SCNC: 103 MMOL/L — SIGNIFICANT CHANGE UP (ref 98–110)
CO2 SERPL-SCNC: 22 MMOL/L — SIGNIFICANT CHANGE UP (ref 17–32)
CREAT SERPL-MCNC: 3.1 MG/DL — HIGH (ref 0.7–1.5)
EGFR: 20 ML/MIN/1.73M2 — LOW
EOSINOPHIL # BLD AUTO: 0.31 K/UL — SIGNIFICANT CHANGE UP (ref 0–0.7)
EOSINOPHIL NFR BLD AUTO: 4.2 % — SIGNIFICANT CHANGE UP (ref 0–8)
GLUCOSE BLDC GLUCOMTR-MCNC: 103 MG/DL — HIGH (ref 70–99)
GLUCOSE BLDC GLUCOMTR-MCNC: 132 MG/DL — HIGH (ref 70–99)
GLUCOSE BLDC GLUCOMTR-MCNC: 134 MG/DL — HIGH (ref 70–99)
GLUCOSE SERPL-MCNC: 100 MG/DL — HIGH (ref 70–99)
HCT VFR BLD CALC: 27.5 % — LOW (ref 42–52)
HGB BLD-MCNC: 8.5 G/DL — LOW (ref 14–18)
IMM GRANULOCYTES NFR BLD AUTO: 0.4 % — HIGH (ref 0.1–0.3)
LYMPHOCYTES # BLD AUTO: 1.96 K/UL — SIGNIFICANT CHANGE UP (ref 1.2–3.4)
LYMPHOCYTES # BLD AUTO: 26.3 % — SIGNIFICANT CHANGE UP (ref 20.5–51.1)
MAGNESIUM SERPL-MCNC: 1.9 MG/DL — SIGNIFICANT CHANGE UP (ref 1.8–2.4)
MCHC RBC-ENTMCNC: 27.9 PG — SIGNIFICANT CHANGE UP (ref 27–31)
MCHC RBC-ENTMCNC: 30.9 G/DL — LOW (ref 32–37)
MCV RBC AUTO: 90.2 FL — SIGNIFICANT CHANGE UP (ref 80–94)
MONOCYTES # BLD AUTO: 0.53 K/UL — SIGNIFICANT CHANGE UP (ref 0.1–0.6)
MONOCYTES NFR BLD AUTO: 7.1 % — SIGNIFICANT CHANGE UP (ref 1.7–9.3)
NEUTROPHILS # BLD AUTO: 4.51 K/UL — SIGNIFICANT CHANGE UP (ref 1.4–6.5)
NEUTROPHILS NFR BLD AUTO: 60.5 % — SIGNIFICANT CHANGE UP (ref 42.2–75.2)
NRBC # BLD: 0 /100 WBCS — SIGNIFICANT CHANGE UP (ref 0–0)
PLATELET # BLD AUTO: 468 K/UL — HIGH (ref 130–400)
POTASSIUM SERPL-MCNC: 5.2 MMOL/L — HIGH (ref 3.5–5)
POTASSIUM SERPL-SCNC: 5.2 MMOL/L — HIGH (ref 3.5–5)
PROT SERPL-MCNC: 6.4 G/DL — SIGNIFICANT CHANGE UP (ref 6–8)
RBC # BLD: 3.05 M/UL — LOW (ref 4.7–6.1)
RBC # FLD: 15.1 % — HIGH (ref 11.5–14.5)
SODIUM SERPL-SCNC: 139 MMOL/L — SIGNIFICANT CHANGE UP (ref 135–146)
TROPONIN T SERPL-MCNC: 0.09 NG/ML — CRITICAL HIGH
WBC # BLD: 7.45 K/UL — SIGNIFICANT CHANGE UP (ref 4.8–10.8)
WBC # FLD AUTO: 7.45 K/UL — SIGNIFICANT CHANGE UP (ref 4.8–10.8)

## 2022-10-29 PROCEDURE — 95816 EEG AWAKE AND DROWSY: CPT | Mod: 26

## 2022-10-29 PROCEDURE — 99233 SBSQ HOSP IP/OBS HIGH 50: CPT

## 2022-10-29 RX ORDER — SODIUM CHLORIDE 9 MG/ML
1000 INJECTION, SOLUTION INTRAVENOUS
Refills: 0 | Status: DISCONTINUED | OUTPATIENT
Start: 2022-10-29 | End: 2022-10-30

## 2022-10-29 RX ADMIN — Medication 650 MILLIGRAM(S): at 17:05

## 2022-10-29 RX ADMIN — Medication 1 MILLIGRAM(S): at 11:46

## 2022-10-29 RX ADMIN — HEPARIN SODIUM 5000 UNIT(S): 5000 INJECTION INTRAVENOUS; SUBCUTANEOUS at 05:03

## 2022-10-29 RX ADMIN — Medication 650 MILLIGRAM(S): at 08:55

## 2022-10-29 RX ADMIN — Medication 40 MILLIGRAM(S): at 05:03

## 2022-10-29 RX ADMIN — Medication 650 MILLIGRAM(S): at 01:30

## 2022-10-29 RX ADMIN — Medication 1 APPLICATION(S): at 11:46

## 2022-10-29 RX ADMIN — Medication 650 MILLIGRAM(S): at 21:23

## 2022-10-29 RX ADMIN — SODIUM CHLORIDE 75 MILLILITER(S): 9 INJECTION, SOLUTION INTRAVENOUS at 09:32

## 2022-10-29 RX ADMIN — HEPARIN SODIUM 5000 UNIT(S): 5000 INJECTION INTRAVENOUS; SUBCUTANEOUS at 17:05

## 2022-10-29 RX ADMIN — CEFTRIAXONE 100 MILLIGRAM(S): 500 INJECTION, POWDER, FOR SOLUTION INTRAMUSCULAR; INTRAVENOUS at 09:17

## 2022-10-29 RX ADMIN — Medication 25 MILLIGRAM(S): at 05:03

## 2022-10-29 NOTE — PHYSICAL THERAPY INITIAL EVALUATION ADULT - GAIT DEVIATIONS NOTED, PT EVAL
Decreased speed, decreased step length/height/decreased kaelyn/decreased step length/decreased stride length

## 2022-10-29 NOTE — PHYSICAL THERAPY INITIAL EVALUATION ADULT - TRANSFER SAFETY CONCERNS NOTED: SIT/STAND, REHAB EVAL
BP's taken: 147/68, 136/63, and 111/53 taken supine, sitting at EOB, and in standing respectively. Pt with no c/o dizziness t/o, KAREN Ramírez notified.

## 2022-10-29 NOTE — PHYSICAL THERAPY INITIAL EVALUATION ADULT - PERTINENT HX OF CURRENT PROBLEM, REHAB EVAL
77 year old male with a history of DM, HTN, CKD and HFpEF, chronic LE edema presented to the ED for syncopal episode in the nursing home. Pt was sitting on his wheelchair and doesnt recall when he passed out, but says it was around 5:30pm Pt woke up at around 9pm with no recollection of what happened. Pt did not fall or hit his head, did not have incontinence or bit his tongue.

## 2022-10-29 NOTE — PROGRESS NOTE ADULT - ATTENDING COMMENTS
Patient seen and examined.   Patient is stable. Has no complaints.   Patient was orthostatic positive, although asymptomatic.   Plan to hold Lasix and hydrate. Repeat orthostatics tomorrow.   Dc planning once orthostatics negative.     Plan of care d/w patient.

## 2022-10-29 NOTE — PHYSICAL THERAPY INITIAL EVALUATION ADULT - GENERAL OBSERVATIONS, REHAB EVAL
[id number] - [id number] Pt rec supine in bed with +tele, +bed alarm, in NAD. Orthostatic pressures taken, presents positive however pt appears asymptomatic, with no c/o dizziness this session. KAREN Ramírez notified.

## 2022-10-30 LAB
ALBUMIN SERPL ELPH-MCNC: 3.2 G/DL — LOW (ref 3.5–5.2)
ALP SERPL-CCNC: 287 U/L — HIGH (ref 30–115)
ALT FLD-CCNC: 21 U/L — SIGNIFICANT CHANGE UP (ref 0–41)
ANION GAP SERPL CALC-SCNC: 12 MMOL/L — SIGNIFICANT CHANGE UP (ref 7–14)
AST SERPL-CCNC: 28 U/L — SIGNIFICANT CHANGE UP (ref 0–41)
BASOPHILS # BLD AUTO: 0.08 K/UL — SIGNIFICANT CHANGE UP (ref 0–0.2)
BASOPHILS NFR BLD AUTO: 1.1 % — HIGH (ref 0–1)
BILIRUB SERPL-MCNC: <0.2 MG/DL — SIGNIFICANT CHANGE UP (ref 0.2–1.2)
BUN SERPL-MCNC: 46 MG/DL — HIGH (ref 10–20)
CALCIUM SERPL-MCNC: 8.7 MG/DL — SIGNIFICANT CHANGE UP (ref 8.4–10.5)
CHLORIDE SERPL-SCNC: 106 MMOL/L — SIGNIFICANT CHANGE UP (ref 98–110)
CO2 SERPL-SCNC: 24 MMOL/L — SIGNIFICANT CHANGE UP (ref 17–32)
CREAT SERPL-MCNC: 3.1 MG/DL — HIGH (ref 0.7–1.5)
EGFR: 20 ML/MIN/1.73M2 — LOW
EOSINOPHIL # BLD AUTO: 0.37 K/UL — SIGNIFICANT CHANGE UP (ref 0–0.7)
EOSINOPHIL NFR BLD AUTO: 5.1 % — SIGNIFICANT CHANGE UP (ref 0–8)
GLUCOSE BLDC GLUCOMTR-MCNC: 121 MG/DL — HIGH (ref 70–99)
GLUCOSE BLDC GLUCOMTR-MCNC: 179 MG/DL — HIGH (ref 70–99)
GLUCOSE SERPL-MCNC: 89 MG/DL — SIGNIFICANT CHANGE UP (ref 70–99)
HCT VFR BLD CALC: 26.1 % — LOW (ref 42–52)
HGB BLD-MCNC: 8.3 G/DL — LOW (ref 14–18)
IMM GRANULOCYTES NFR BLD AUTO: 0.5 % — HIGH (ref 0.1–0.3)
LYMPHOCYTES # BLD AUTO: 2.13 K/UL — SIGNIFICANT CHANGE UP (ref 1.2–3.4)
LYMPHOCYTES # BLD AUTO: 29.3 % — SIGNIFICANT CHANGE UP (ref 20.5–51.1)
MAGNESIUM SERPL-MCNC: 1.7 MG/DL — LOW (ref 1.8–2.4)
MCHC RBC-ENTMCNC: 28.3 PG — SIGNIFICANT CHANGE UP (ref 27–31)
MCHC RBC-ENTMCNC: 31.8 G/DL — LOW (ref 32–37)
MCV RBC AUTO: 89.1 FL — SIGNIFICANT CHANGE UP (ref 80–94)
MONOCYTES # BLD AUTO: 0.56 K/UL — SIGNIFICANT CHANGE UP (ref 0.1–0.6)
MONOCYTES NFR BLD AUTO: 7.7 % — SIGNIFICANT CHANGE UP (ref 1.7–9.3)
NEUTROPHILS # BLD AUTO: 4.1 K/UL — SIGNIFICANT CHANGE UP (ref 1.4–6.5)
NEUTROPHILS NFR BLD AUTO: 56.3 % — SIGNIFICANT CHANGE UP (ref 42.2–75.2)
NRBC # BLD: 0 /100 WBCS — SIGNIFICANT CHANGE UP (ref 0–0)
PLATELET # BLD AUTO: 398 K/UL — SIGNIFICANT CHANGE UP (ref 130–400)
POTASSIUM SERPL-MCNC: 5.6 MMOL/L — HIGH (ref 3.5–5)
POTASSIUM SERPL-SCNC: 5.6 MMOL/L — HIGH (ref 3.5–5)
PROT SERPL-MCNC: 6.2 G/DL — SIGNIFICANT CHANGE UP (ref 6–8)
RBC # BLD: 2.93 M/UL — LOW (ref 4.7–6.1)
RBC # FLD: 15.1 % — HIGH (ref 11.5–14.5)
SODIUM SERPL-SCNC: 142 MMOL/L — SIGNIFICANT CHANGE UP (ref 135–146)
WBC # BLD: 7.28 K/UL — SIGNIFICANT CHANGE UP (ref 4.8–10.8)
WBC # FLD AUTO: 7.28 K/UL — SIGNIFICANT CHANGE UP (ref 4.8–10.8)

## 2022-10-30 PROCEDURE — 99232 SBSQ HOSP IP/OBS MODERATE 35: CPT

## 2022-10-30 RX ORDER — MAGNESIUM SULFATE 500 MG/ML
2 VIAL (ML) INJECTION ONCE
Refills: 0 | Status: COMPLETED | OUTPATIENT
Start: 2022-10-30 | End: 2022-10-30

## 2022-10-30 RX ORDER — SODIUM ZIRCONIUM CYCLOSILICATE 10 G/10G
5 POWDER, FOR SUSPENSION ORAL THREE TIMES A DAY
Refills: 0 | Status: COMPLETED | OUTPATIENT
Start: 2022-10-30 | End: 2022-10-30

## 2022-10-30 RX ORDER — FUROSEMIDE 40 MG
40 TABLET ORAL DAILY
Refills: 0 | Status: DISCONTINUED | OUTPATIENT
Start: 2022-10-30 | End: 2022-11-01

## 2022-10-30 RX ADMIN — SODIUM ZIRCONIUM CYCLOSILICATE 5 GRAM(S): 10 POWDER, FOR SUSPENSION ORAL at 21:41

## 2022-10-30 RX ADMIN — Medication 1 MILLIGRAM(S): at 12:46

## 2022-10-30 RX ADMIN — HEPARIN SODIUM 5000 UNIT(S): 5000 INJECTION INTRAVENOUS; SUBCUTANEOUS at 18:09

## 2022-10-30 RX ADMIN — Medication 40 MILLIGRAM(S): at 14:13

## 2022-10-30 RX ADMIN — SODIUM ZIRCONIUM CYCLOSILICATE 5 GRAM(S): 10 POWDER, FOR SUSPENSION ORAL at 13:43

## 2022-10-30 RX ADMIN — HEPARIN SODIUM 5000 UNIT(S): 5000 INJECTION INTRAVENOUS; SUBCUTANEOUS at 05:59

## 2022-10-30 RX ADMIN — Medication 25 GRAM(S): at 13:25

## 2022-10-30 RX ADMIN — Medication 25 MILLIGRAM(S): at 05:58

## 2022-10-30 RX ADMIN — SODIUM ZIRCONIUM CYCLOSILICATE 5 GRAM(S): 10 POWDER, FOR SUSPENSION ORAL at 10:21

## 2022-10-30 RX ADMIN — Medication 1 APPLICATION(S): at 12:46

## 2022-10-30 RX ADMIN — Medication 650 MILLIGRAM(S): at 05:58

## 2022-10-30 RX ADMIN — Medication 650 MILLIGRAM(S): at 21:41

## 2022-10-30 RX ADMIN — Medication 650 MILLIGRAM(S): at 13:23

## 2022-10-31 ENCOUNTER — TRANSCRIPTION ENCOUNTER (OUTPATIENT)
Age: 77
End: 2022-10-31

## 2022-10-31 LAB
-  AMIKACIN: SIGNIFICANT CHANGE UP
-  AMOXICILLIN/CLAVULANIC ACID: SIGNIFICANT CHANGE UP
-  AMPICILLIN/SULBACTAM: SIGNIFICANT CHANGE UP
-  AMPICILLIN: SIGNIFICANT CHANGE UP
-  AZTREONAM: SIGNIFICANT CHANGE UP
-  CEFAZOLIN: SIGNIFICANT CHANGE UP
-  CEFEPIME: SIGNIFICANT CHANGE UP
-  CEFOXITIN: SIGNIFICANT CHANGE UP
-  CEFTRIAXONE: SIGNIFICANT CHANGE UP
-  CIPROFLOXACIN: SIGNIFICANT CHANGE UP
-  ERTAPENEM: SIGNIFICANT CHANGE UP
-  GENTAMICIN: SIGNIFICANT CHANGE UP
-  IMIPENEM: SIGNIFICANT CHANGE UP
-  LEVOFLOXACIN: SIGNIFICANT CHANGE UP
-  MEROPENEM: SIGNIFICANT CHANGE UP
-  NITROFURANTOIN: SIGNIFICANT CHANGE UP
-  PIPERACILLIN/TAZOBACTAM: SIGNIFICANT CHANGE UP
-  TIGECYCLINE: SIGNIFICANT CHANGE UP
-  TOBRAMYCIN: SIGNIFICANT CHANGE UP
-  TRIMETHOPRIM/SULFAMETHOXAZOLE: SIGNIFICANT CHANGE UP
ALBUMIN SERPL ELPH-MCNC: 3.1 G/DL — LOW (ref 3.5–5.2)
ALP SERPL-CCNC: 280 U/L — HIGH (ref 30–115)
ALT FLD-CCNC: 21 U/L — SIGNIFICANT CHANGE UP (ref 0–41)
ANION GAP SERPL CALC-SCNC: 12 MMOL/L — SIGNIFICANT CHANGE UP (ref 7–14)
ANION GAP SERPL CALC-SCNC: 13 MMOL/L — SIGNIFICANT CHANGE UP (ref 7–14)
AST SERPL-CCNC: 25 U/L — SIGNIFICANT CHANGE UP (ref 0–41)
BASOPHILS # BLD AUTO: 0.1 K/UL — SIGNIFICANT CHANGE UP (ref 0–0.2)
BASOPHILS NFR BLD AUTO: 1.4 % — HIGH (ref 0–1)
BILIRUB SERPL-MCNC: <0.2 MG/DL — SIGNIFICANT CHANGE UP (ref 0.2–1.2)
BUN SERPL-MCNC: 47 MG/DL — HIGH (ref 10–20)
BUN SERPL-MCNC: 48 MG/DL — HIGH (ref 10–20)
CALCIUM SERPL-MCNC: 8.4 MG/DL — SIGNIFICANT CHANGE UP (ref 8.4–10.5)
CALCIUM SERPL-MCNC: 8.7 MG/DL — SIGNIFICANT CHANGE UP (ref 8.4–10.4)
CHLORIDE SERPL-SCNC: 104 MMOL/L — SIGNIFICANT CHANGE UP (ref 98–110)
CHLORIDE SERPL-SCNC: 106 MMOL/L — SIGNIFICANT CHANGE UP (ref 98–110)
CO2 SERPL-SCNC: 22 MMOL/L — SIGNIFICANT CHANGE UP (ref 17–32)
CO2 SERPL-SCNC: 23 MMOL/L — SIGNIFICANT CHANGE UP (ref 17–32)
CREAT SERPL-MCNC: 3 MG/DL — HIGH (ref 0.7–1.5)
CREAT SERPL-MCNC: 3.2 MG/DL — HIGH (ref 0.7–1.5)
CULTURE RESULTS: SIGNIFICANT CHANGE UP
EGFR: 19 ML/MIN/1.73M2 — LOW
EGFR: 21 ML/MIN/1.73M2 — LOW
EOSINOPHIL # BLD AUTO: 0.38 K/UL — SIGNIFICANT CHANGE UP (ref 0–0.7)
EOSINOPHIL NFR BLD AUTO: 5.2 % — SIGNIFICANT CHANGE UP (ref 0–8)
GLUCOSE BLDC GLUCOMTR-MCNC: 115 MG/DL — HIGH (ref 70–99)
GLUCOSE BLDC GLUCOMTR-MCNC: 134 MG/DL — HIGH (ref 70–99)
GLUCOSE BLDC GLUCOMTR-MCNC: 162 MG/DL — HIGH (ref 70–99)
GLUCOSE SERPL-MCNC: 102 MG/DL — HIGH (ref 70–99)
GLUCOSE SERPL-MCNC: 119 MG/DL — HIGH (ref 70–99)
HCT VFR BLD CALC: 25.1 % — LOW (ref 42–52)
HGB BLD-MCNC: 8 G/DL — LOW (ref 14–18)
IMM GRANULOCYTES NFR BLD AUTO: 0.5 % — HIGH (ref 0.1–0.3)
LYMPHOCYTES # BLD AUTO: 1.94 K/UL — SIGNIFICANT CHANGE UP (ref 1.2–3.4)
LYMPHOCYTES # BLD AUTO: 26.4 % — SIGNIFICANT CHANGE UP (ref 20.5–51.1)
MAGNESIUM SERPL-MCNC: 2 MG/DL — SIGNIFICANT CHANGE UP (ref 1.8–2.4)
MCHC RBC-ENTMCNC: 28.7 PG — SIGNIFICANT CHANGE UP (ref 27–31)
MCHC RBC-ENTMCNC: 31.9 G/DL — LOW (ref 32–37)
MCV RBC AUTO: 90 FL — SIGNIFICANT CHANGE UP (ref 80–94)
METHOD TYPE: SIGNIFICANT CHANGE UP
MONOCYTES # BLD AUTO: 0.58 K/UL — SIGNIFICANT CHANGE UP (ref 0.1–0.6)
MONOCYTES NFR BLD AUTO: 7.9 % — SIGNIFICANT CHANGE UP (ref 1.7–9.3)
NEUTROPHILS # BLD AUTO: 4.31 K/UL — SIGNIFICANT CHANGE UP (ref 1.4–6.5)
NEUTROPHILS NFR BLD AUTO: 58.6 % — SIGNIFICANT CHANGE UP (ref 42.2–75.2)
NRBC # BLD: 0 /100 WBCS — SIGNIFICANT CHANGE UP (ref 0–0)
ORGANISM # SPEC MICROSCOPIC CNT: SIGNIFICANT CHANGE UP
ORGANISM # SPEC MICROSCOPIC CNT: SIGNIFICANT CHANGE UP
PLATELET # BLD AUTO: 362 K/UL — SIGNIFICANT CHANGE UP (ref 130–400)
POTASSIUM SERPL-MCNC: 5.2 MMOL/L — HIGH (ref 3.5–5)
POTASSIUM SERPL-MCNC: 5.4 MMOL/L — HIGH (ref 3.5–5)
POTASSIUM SERPL-SCNC: 5.2 MMOL/L — HIGH (ref 3.5–5)
POTASSIUM SERPL-SCNC: 5.4 MMOL/L — HIGH (ref 3.5–5)
PROT SERPL-MCNC: 5.9 G/DL — LOW (ref 6–8)
RBC # BLD: 2.79 M/UL — LOW (ref 4.7–6.1)
RBC # FLD: 15.1 % — HIGH (ref 11.5–14.5)
SARS-COV-2 RNA SPEC QL NAA+PROBE: SIGNIFICANT CHANGE UP
SODIUM SERPL-SCNC: 140 MMOL/L — SIGNIFICANT CHANGE UP (ref 135–146)
SODIUM SERPL-SCNC: 140 MMOL/L — SIGNIFICANT CHANGE UP (ref 135–146)
SPECIMEN SOURCE: SIGNIFICANT CHANGE UP
WBC # BLD: 7.35 K/UL — SIGNIFICANT CHANGE UP (ref 4.8–10.8)
WBC # FLD AUTO: 7.35 K/UL — SIGNIFICANT CHANGE UP (ref 4.8–10.8)

## 2022-10-31 PROCEDURE — 99233 SBSQ HOSP IP/OBS HIGH 50: CPT

## 2022-10-31 PROCEDURE — 99222 1ST HOSP IP/OBS MODERATE 55: CPT

## 2022-10-31 RX ORDER — SODIUM ZIRCONIUM CYCLOSILICATE 10 G/10G
10 POWDER, FOR SUSPENSION ORAL ONCE
Refills: 0 | Status: COMPLETED | OUTPATIENT
Start: 2022-10-31 | End: 2022-11-01

## 2022-10-31 RX ADMIN — Medication 650 MILLIGRAM(S): at 05:54

## 2022-10-31 RX ADMIN — Medication 40 MILLIGRAM(S): at 05:54

## 2022-10-31 RX ADMIN — Medication 650 MILLIGRAM(S): at 21:29

## 2022-10-31 RX ADMIN — Medication 25 MILLIGRAM(S): at 05:54

## 2022-10-31 RX ADMIN — Medication 1 APPLICATION(S): at 11:54

## 2022-10-31 RX ADMIN — Medication 1 MILLIGRAM(S): at 11:54

## 2022-10-31 RX ADMIN — HEPARIN SODIUM 5000 UNIT(S): 5000 INJECTION INTRAVENOUS; SUBCUTANEOUS at 17:31

## 2022-10-31 RX ADMIN — HEPARIN SODIUM 5000 UNIT(S): 5000 INJECTION INTRAVENOUS; SUBCUTANEOUS at 05:54

## 2022-10-31 RX ADMIN — Medication 650 MILLIGRAM(S): at 14:11

## 2022-10-31 NOTE — DISCHARGE NOTE PROVIDER - NSDCMRMEDTOKEN_GEN_ALL_CORE_FT
amLODIPine 5 mg oral tablet: 1 tab(s) orally once a day  furosemide 40 mg oral tablet: 1 tab(s) orally once a day  insulin aspart 100 units/mL injectable solution: 10 unit(s) injectable 3 times a day (with meals)  lactulose 10 g/15 mL oral syrup: 30 milliliter(s) orally 2 times a day  metoprolol succinate 25 mg oral tablet, extended release: 1 tab(s) orally once a day  polyethylene glycol 3350 oral powder for reconstitution: 17 gram(s) orally 2 times a day  Silvadene 1% topical cream: Apply topically to affected area once a day to left thigh and to sacrum  sodium bicarbonate 650 mg oral tablet: 2 tab(s) orally 3 times a day

## 2022-10-31 NOTE — DISCHARGE NOTE PROVIDER - PROVIDER TOKENS
PROVIDER:[TOKEN:[[id number]],FOLLOWUP:[2 weeks]],PROVIDER:[TOKEN:[[id number]],FOLLOWUP:[2 weeks]],PROVIDER:[TOKEN:[[id number]],FOLLOWUP:[2 weeks]]

## 2022-10-31 NOTE — DISCHARGE NOTE PROVIDER - CARE PROVIDERS DIRECT ADDRESSES
[email or web address]

## 2022-10-31 NOTE — DISCHARGE NOTE PROVIDER - NSDCCPCAREPLAN_GEN_ALL_CORE_FT
PRINCIPAL DISCHARGE DIAGNOSIS  Diagnosis: Syncope  Assessment and Plan of Treatment: You came to the hospital after having "passed out". In the emergency room you had a ct scan of your head which was normal and was observed for any cardiology concerns and was found to have none. You were seen by the cardiologist who recommended to follow up when you are dischagred. Take all medications as directed, if you experience worsening symptoms return to hospital for further management.      SECONDARY DISCHARGE DIAGNOSES  Diagnosis: Hyperkalemia  Assessment and Plan of Treatment: During this hospitalization you were found to have a high potassium level. We treated you with meds and  your potasium level went down. Take all meds as directed. Avoid nephrotoxic medications. Follow up with the nephrologist for your chronic kidney disease.

## 2022-10-31 NOTE — DISCHARGE NOTE PROVIDER - CARE PROVIDER_API CALL
Víctor Martinez)  Cardiovascular Disease; Internal Medicine; Interventional Cardiology  [address]  [place] [address]  Phone: [phone]  Fax: [phone]  Follow Up Time: 2 weeks    Sebas Betancourt (DO)  Medicine  [address] [place] [address]  Phone: [phone]  Fax: [phone]  Follow Up Time: 2 weeks    Leandro Barbosa)  Internal Medicine; Nephrology  [address] [place] [address]  Phone: [phone]  Fax: [phone]  Follow Up Time: 2 weeks

## 2022-10-31 NOTE — DISCHARGE NOTE PROVIDER - HOSPITAL COURSE
77 year old male with a history of DM, HTN, CKD and HFpEF, chronic LE edema presented to the ED for syncopal episode in the nursing home. Pt was sitting on his wheelchair and doesnt recall when he passed out, but says it was around 5:30pm Pt woke up at around 9pm with no recollection of what happened. Pt did not fall or hit his head, did not have incontinence or bit his tongue.     Pt was recently admitted for LE edema and JONH was discharged to [facility] for Rehab    ED vitals:   /76, RR 20, Temp 98.2, O2 sat 98% on RA    Sig labs:   Hg 9.1, Cr. 3.1    EKG: Accelerated Junctional rhythm    CT head neg    Xray chest: no sig change since prior        Admitted to medicine for syncope. Was stable on telemetry. Cardiology was consulted and recs op f/u    77 year old male with a history of DM, HTN, CKD and HFpEF, chronic LE edema presented to the ED for syncopal episode in the nursing home.    # Syncope  - CT head unremarkable  - EEG negative   - EKG: NSR   - No events on telemetry   - orthostatics positive - improving, check daily   - echo from 10.13.22: EF 70 to 75%, Hyperdynamic global left ventricular systolic function, Dilatation of the ascending aorta at 4.2 cm., grade 1 DD  - No need for repeat TTE     # CKD Stage 4  # Hyperkalemia   # Anemia of chronic disease   - low k diet  - s/p Lokelma   - c/w Lasix for now  - Cont sodium bicarb    # TAA  - Echo 10/13: 4.2cm dilatation of ascending aorta    # Hx of DM2?  - last A1c of 5.9  -continue op meds on dc    # HTN  - Cont amlodipine 5mg qD, BB

## 2022-10-31 NOTE — CONSULT NOTE ADULT - ASSESSMENT
77 year old male with a history of DM, HTN, CKD and HFpEF, chronic LE edema presented to the ED for syncopal episode in the nursing home.    #syncopal episode likely 2/2 orthostatic hypotension  - no events on tele  - Echo EF 70-75 %  - orthostatic positive  - Recommend to decrease lasix 40 mg to 20 mg qod  - c/w metoprolol  - c/w compression stockings 77 year old male with a history of DM, HTN, CKD and HFpEF, chronic LE edema presented to the ED for syncopal episode in the nursing home.    #syncopal episode likely 2/2 orthostatic hypotension  - no events on tele  - Echo EF 70-75 %  - orthostatic positive  - Recommend to decrease Lasix to 20 mg qod  - c/w metoprolol  - c/w compression stockings

## 2022-10-31 NOTE — CONSULT NOTE ADULT - SUBJECTIVE AND OBJECTIVE BOX
HPI:  77 year old male with a history of DM, HTN, CKD and HFpEF, chronic LE edema presented to the ED for syncopal episode in the nursing home. Pt was sitting on his wheelchair and doesnt recall when he passed out, but says it was around 5:30pm Pt woke up at around 9pm with no recollection of what happened. Pt did not fall or hit his head, did not have incontinence or bit his tongue.     Pt was recently admitted for LE edema and JONH was discharged to [facility] for Rehab    ED vitals:   /76, RR 20, Temp 98.2, O2 sat 98% on RA    Sig labs:   Hg 9.1, Cr. 3.1    EKG: Accelerated Junctional rhythm    CT head neg    Xray chest: no sig change since prior (28 Oct 2022 04:27)      INTERVAL HISTORY:    PAST MEDICAL & SURGICAL HISTORY  CKD (chronic kidney disease)    Hypertension    Type 2 diabetes mellitus        ALLERGIES:  No Known Allergies      MEDICATIONS:  MEDICATIONS  (STANDING):  folic acid 1 milliGRAM(s) Oral daily  furosemide    Tablet 40 milliGRAM(s) Oral daily  heparin   Injectable 5000 Unit(s) SubCutaneous every 12 hours  metoprolol succinate ER 25 milliGRAM(s) Oral daily  silver sulfADIAZINE 1% Cream 1 Application(s) Topical daily  sodium bicarbonate 650 milliGRAM(s) Oral every 8 hours  sodium zirconium cyclosilicate 10 Gram(s) Oral once    MEDICATIONS  (PRN):  acetaminophen     Tablet .. 650 milliGRAM(s) Oral every 6 hours PRN Temp greater or equal to 38C (100.4F), Mild Pain (1 - 3)      HOME MEDICATIONS:  Home Medications:  amLODIPine 5 mg oral tablet: 1 tab(s) orally once a day (28 Oct 2022 04:41)  furosemide 40 mg oral tablet: 1 tab(s) orally once a day (28 Oct 2022 04:41)  insulin aspart 100 units/mL injectable solution: 10 unit(s) injectable 3 times a day (with meals) (28 Oct 2022 04:41)  lactulose 10 g/15 mL oral syrup: 30 milliliter(s) orally 2 times a day (28 Oct 2022 04:41)  metoprolol succinate 25 mg oral tablet, extended release: 1 tab(s) orally once a day (28 Oct 2022 04:41)  polyethylene glycol 3350 oral powder for reconstitution: 17 gram(s) orally 2 times a day (28 Oct 2022 04:41)  Silvadene 1% topical cream: Apply topically to affected area once a day to left thigh and to sacrum (28 Oct 2022 04:41)  sodium bicarbonate 650 mg oral tablet: 2 tab(s) orally 3 times a day (28 Oct 2022 04:41)        OBJECTIVE:  ICU Vital Signs Last 24 Hrs  T(C): 35.9 (31 Oct 2022 05:00), Max: 36.1 (30 Oct 2022 17:35)  T(F): 96.7 (31 Oct 2022 05:00), Max: 96.9 (30 Oct 2022 17:35)  HR: 75 (31 Oct 2022 05:00) (75 - 84)  BP: 126/60 (31 Oct 2022 05:00) (118/58 - 132/62)  BP(mean): --  ABP: --  ABP(mean): --  RR: 18 (31 Oct 2022 05:00) (17 - 19)  SpO2: 97% (30 Oct 2022 20:04) (97% - 98%)    O2 Parameters below as of 30 Oct 2022 20:04  Patient On (Oxygen Delivery Method): room air            Adult Advanced Hemodynamics Last 24 Hrs  CVP(mm Hg): --  CVP(cm H2O): --  CO: --  CI: --  PA: --  PA(mean): --  PCWP: --  SVR: --  SVRI: --  PVR: --  PVRI: --  I&O's Summary    30 Oct 2022 07:01  -  31 Oct 2022 07:00  --------------------------------------------------------  IN: 180 mL / OUT: 401 mL / NET: -221 mL      Daily Height in cm: 175.26 (30 Oct 2022 17:35)    Daily     PHYSICAL EXAM:  NEURO: patient is awake , alert and oriented  GEN: Not in acute distress  NECK: no thyroid enlargement, no JVD  LUNGS: Clear to auscultation bilaterally   CARDIOVASCULAR: S1/S2 present, RRR , no murmurs or rubs  ABD: Soft, non-tender, non-distended, +BS  EXT: chronic LE edema  SKIN: Intact      LABS:                        8.0    7.35  )-----------( 362      ( 31 Oct 2022 06:33 )             25.1     10-31    140  |  106  |  48<H>  ----------------------------<  102<H>  5.2<H>   |  22  |  3.2<H>    Ca    8.4      31 Oct 2022 06:33  Mg     2.0     10-31    TPro  5.9<L>  /  Alb  3.1<L>  /  TBili  <0.2  /  DBili  x   /  AST  25  /  ALT  21  /  AlkPhos  280<H>  10-31              Troponin trend:      10-13 Chol 128 LDL -- HDL 49 Trig 152<H>      RADIOLOGY:  -TTE:  < from: TTE Echo Complete w/o Contrast w/ Doppler (10.13.22 @ 07:06) >  Summary:   1. Left ventricular ejection fraction, byvisual estimation, is 70 to   75%.   2. Hyperdynamic global left ventricular systolic function.   3. Mildly increased LV wall thickness.   4. Mild mitral valve regurgitation.   5. Mild thickening and calcification of the anterior and posterior   mitral valve leaflets.   6. Moderate mitral annular calcification.   7. Dilatation of the ascending aorta at 4.2 cm.   8. Sclerotic aortic valve with normal opening.   9. Mild pulmonic valve regurgitation.  10. Spectral Doppler shows impaired relaxation pattern of left   ventricular myocardial filling (Grade I diastolic dysfunction).    < end of copied text >    -STRESS TEST:  -CATHETERIZATION:    ECG: Sinus rhythm    TELEMETRY EVENTS: no events on tele       HPI:  77 year old male with a history of DM, HTN, CKD and HFpEF, chronic LE edema presented to the ED for syncopal episode in the nursing home. Pt was sitting on his wheelchair and doesnt recall when he passed out, but says it was around 5:30pm Pt woke up at around 9pm with no recollection of what happened. Pt did not fall or hit his head, did not have incontinence or bit his tongue.     Pt was recently admitted for LE edema and JONH was discharged to [facility] for Rehab    ED vitals:   /76, RR 20, Temp 98.2, O2 sat 98% on RA    Sig labs:   Hg 9.1, Cr. 3.1    EKG: Accelerated Junctional rhythm    CT head neg    Xray chest: no sig change since prior (28 Oct 2022 04:27)      INTERVAL HISTORY:    PAST MEDICAL & SURGICAL HISTORY  CKD (chronic kidney disease)    Hypertension    Type 2 diabetes mellitus        ALLERGIES:  No Known Allergies      MEDICATIONS:  MEDICATIONS  (STANDING):  folic acid 1 milliGRAM(s) Oral daily  furosemide    Tablet 40 milliGRAM(s) Oral daily  heparin   Injectable 5000 Unit(s) SubCutaneous every 12 hours  metoprolol succinate ER 25 milliGRAM(s) Oral daily  silver sulfADIAZINE 1% Cream 1 Application(s) Topical daily  sodium bicarbonate 650 milliGRAM(s) Oral every 8 hours  sodium zirconium cyclosilicate 10 Gram(s) Oral once    MEDICATIONS  (PRN):  acetaminophen     Tablet .. 650 milliGRAM(s) Oral every 6 hours PRN Temp greater or equal to 38C (100.4F), Mild Pain (1 - 3)      HOME MEDICATIONS:  Home Medications:  amLODIPine 5 mg oral tablet: 1 tab(s) orally once a day (28 Oct 2022 04:41)  furosemide 40 mg oral tablet: 1 tab(s) orally once a day (28 Oct 2022 04:41)  insulin aspart 100 units/mL injectable solution: 10 unit(s) injectable 3 times a day (with meals) (28 Oct 2022 04:41)  lactulose 10 g/15 mL oral syrup: 30 milliliter(s) orally 2 times a day (28 Oct 2022 04:41)  metoprolol succinate 25 mg oral tablet, extended release: 1 tab(s) orally once a day (28 Oct 2022 04:41)  polyethylene glycol 3350 oral powder for reconstitution: 17 gram(s) orally 2 times a day (28 Oct 2022 04:41)  Silvadene 1% topical cream: Apply topically to affected area once a day to left thigh and to sacrum (28 Oct 2022 04:41)  sodium bicarbonate 650 mg oral tablet: 2 tab(s) orally 3 times a day (28 Oct 2022 04:41)        OBJECTIVE:  ICU Vital Signs Last 24 Hrs  T(C): 35.9 (31 Oct 2022 05:00), Max: 36.1 (30 Oct 2022 17:35)  T(F): 96.7 (31 Oct 2022 05:00), Max: 96.9 (30 Oct 2022 17:35)  HR: 75 (31 Oct 2022 05:00) (75 - 84)  BP: 126/60 (31 Oct 2022 05:00) (118/58 - 132/62)  BP(mean): --  ABP: --  ABP(mean): --  RR: 18 (31 Oct 2022 05:00) (17 - 19)  SpO2: 97% (30 Oct 2022 20:04) (97% - 98%)    O2 Parameters below as of 30 Oct 2022 20:04  Patient On (Oxygen Delivery Method): room air            Adult Advanced Hemodynamics Last 24 Hrs  CVP(mm Hg): --  CVP(cm H2O): --  CO: --  CI: --  PA: --  PA(mean): --  PCWP: --  SVR: --  SVRI: --  PVR: --  PVRI: --  I&O's Summary    30 Oct 2022 07:01  -  31 Oct 2022 07:00  --------------------------------------------------------  IN: 180 mL / OUT: 401 mL / NET: -221 mL      Daily Height in cm: 175.26 (30 Oct 2022 17:35)    Daily     PHYSICAL EXAM:  NEURO: patient is awake , alert and oriented  GEN: Not in acute distress  NECK: no thyroid enlargement, no JVD  LUNGS: Clear to auscultation bilaterally   CARDIOVASCULAR: S1/S2 present, RRR , no murmurs or rubs  ABD: Soft, non-tender, non-distended, +BS  EXT: chronic LE edema  SKIN: Intact      LABS:                        8.0    7.35  )-----------( 362      ( 31 Oct 2022 06:33 )             25.1     10-31    140  |  106  |  48<H>  ----------------------------<  102<H>  5.2<H>   |  22  |  3.2<H>    Ca    8.4      31 Oct 2022 06:33  Mg     2.0     10-31    TPro  5.9<L>  /  Alb  3.1<L>  /  TBili  <0.2  /  DBili  x   /  AST  25  /  ALT  21  /  AlkPhos  280<H>  10-31              Troponin trend:      10-13 Chol 128 LDL -- HDL 49 Trig 152<H>      RADIOLOGY:  -TTE:  < from: TTE Echo Complete w/o Contrast w/ Doppler (10.13.22 @ 07:06) >  Summary:   1. Left ventricular ejection fraction, byvisual estimation, is 70 to   75%.   2. Hyperdynamic global left ventricular systolic function.   3. Mildly increased LV wall thickness.   4. Mild mitral valve regurgitation.   5. Mild thickening and calcification of the anterior and posterior   mitral valve leaflets.   6. Moderate mitral annular calcification.   7. Dilatation of the ascending aorta at 4.2 cm.   8. Sclerotic aortic valve with normal opening.   9. Mild pulmonic valve regurgitation.  10. Spectral Doppler shows impaired relaxation pattern of left   ventricular myocardial filling (Grade I diastolic dysfunction).    < end of copied text >    -STRESS TEST:  -CATHETERIZATION:    ECG: Sinus rhythm      TELEMETRY EVENTS: no events on tele

## 2022-11-01 VITALS
DIASTOLIC BLOOD PRESSURE: 57 MMHG | RESPIRATION RATE: 18 BRPM | TEMPERATURE: 99 F | HEART RATE: 77 BPM | SYSTOLIC BLOOD PRESSURE: 107 MMHG

## 2022-11-01 LAB
ALBUMIN SERPL ELPH-MCNC: 3.2 G/DL — LOW (ref 3.5–5.2)
ALP SERPL-CCNC: 303 U/L — HIGH (ref 30–115)
ALT FLD-CCNC: 26 U/L — SIGNIFICANT CHANGE UP (ref 0–41)
ANION GAP SERPL CALC-SCNC: 12 MMOL/L — SIGNIFICANT CHANGE UP (ref 7–14)
AST SERPL-CCNC: 31 U/L — SIGNIFICANT CHANGE UP (ref 0–41)
BASOPHILS # BLD AUTO: 0.1 K/UL — SIGNIFICANT CHANGE UP (ref 0–0.2)
BASOPHILS NFR BLD AUTO: 1.3 % — HIGH (ref 0–1)
BILIRUB SERPL-MCNC: <0.2 MG/DL — SIGNIFICANT CHANGE UP (ref 0.2–1.2)
BUN SERPL-MCNC: 47 MG/DL — HIGH (ref 10–20)
CALCIUM SERPL-MCNC: 8.7 MG/DL — SIGNIFICANT CHANGE UP (ref 8.4–10.5)
CHLORIDE SERPL-SCNC: 106 MMOL/L — SIGNIFICANT CHANGE UP (ref 98–110)
CO2 SERPL-SCNC: 22 MMOL/L — SIGNIFICANT CHANGE UP (ref 17–32)
CREAT SERPL-MCNC: 2.9 MG/DL — HIGH (ref 0.7–1.5)
EGFR: 21 ML/MIN/1.73M2 — LOW
EOSINOPHIL # BLD AUTO: 0.36 K/UL — SIGNIFICANT CHANGE UP (ref 0–0.7)
EOSINOPHIL NFR BLD AUTO: 4.5 % — SIGNIFICANT CHANGE UP (ref 0–8)
GLUCOSE BLDC GLUCOMTR-MCNC: 113 MG/DL — HIGH (ref 70–99)
GLUCOSE BLDC GLUCOMTR-MCNC: 125 MG/DL — HIGH (ref 70–99)
GLUCOSE BLDC GLUCOMTR-MCNC: 98 MG/DL — SIGNIFICANT CHANGE UP (ref 70–99)
GLUCOSE SERPL-MCNC: 105 MG/DL — HIGH (ref 70–99)
HCT VFR BLD CALC: 26.7 % — LOW (ref 42–52)
HGB BLD-MCNC: 8.5 G/DL — LOW (ref 14–18)
IMM GRANULOCYTES NFR BLD AUTO: 0.6 % — HIGH (ref 0.1–0.3)
LYMPHOCYTES # BLD AUTO: 1.98 K/UL — SIGNIFICANT CHANGE UP (ref 1.2–3.4)
LYMPHOCYTES # BLD AUTO: 25 % — SIGNIFICANT CHANGE UP (ref 20.5–51.1)
MAGNESIUM SERPL-MCNC: 1.9 MG/DL — SIGNIFICANT CHANGE UP (ref 1.8–2.4)
MCHC RBC-ENTMCNC: 28.9 PG — SIGNIFICANT CHANGE UP (ref 27–31)
MCHC RBC-ENTMCNC: 31.8 G/DL — LOW (ref 32–37)
MCV RBC AUTO: 90.8 FL — SIGNIFICANT CHANGE UP (ref 80–94)
MONOCYTES # BLD AUTO: 0.64 K/UL — HIGH (ref 0.1–0.6)
MONOCYTES NFR BLD AUTO: 8.1 % — SIGNIFICANT CHANGE UP (ref 1.7–9.3)
NEUTROPHILS # BLD AUTO: 4.8 K/UL — SIGNIFICANT CHANGE UP (ref 1.4–6.5)
NEUTROPHILS NFR BLD AUTO: 60.5 % — SIGNIFICANT CHANGE UP (ref 42.2–75.2)
NRBC # BLD: 0 /100 WBCS — SIGNIFICANT CHANGE UP (ref 0–0)
PLATELET # BLD AUTO: 373 K/UL — SIGNIFICANT CHANGE UP (ref 130–400)
POTASSIUM SERPL-MCNC: 5.1 MMOL/L — HIGH (ref 3.5–5)
POTASSIUM SERPL-SCNC: 5.1 MMOL/L — HIGH (ref 3.5–5)
PROT SERPL-MCNC: 6.2 G/DL — SIGNIFICANT CHANGE UP (ref 6–8)
RBC # BLD: 2.94 M/UL — LOW (ref 4.7–6.1)
RBC # FLD: 15.1 % — HIGH (ref 11.5–14.5)
SODIUM SERPL-SCNC: 140 MMOL/L — SIGNIFICANT CHANGE UP (ref 135–146)
WBC # BLD: 7.93 K/UL — SIGNIFICANT CHANGE UP (ref 4.8–10.8)
WBC # FLD AUTO: 7.93 K/UL — SIGNIFICANT CHANGE UP (ref 4.8–10.8)

## 2022-11-01 PROCEDURE — 99233 SBSQ HOSP IP/OBS HIGH 50: CPT

## 2022-11-01 RX ORDER — SODIUM ZIRCONIUM CYCLOSILICATE 10 G/10G
5 POWDER, FOR SUSPENSION ORAL
Refills: 0 | Status: DISCONTINUED | OUTPATIENT
Start: 2022-11-01 | End: 2022-11-01

## 2022-11-01 RX ORDER — SODIUM ZIRCONIUM CYCLOSILICATE 10 G/10G
10 POWDER, FOR SUSPENSION ORAL ONCE
Refills: 0 | Status: COMPLETED | OUTPATIENT
Start: 2022-11-01 | End: 2022-11-01

## 2022-11-01 RX ADMIN — Medication 1 APPLICATION(S): at 11:04

## 2022-11-01 RX ADMIN — HEPARIN SODIUM 5000 UNIT(S): 5000 INJECTION INTRAVENOUS; SUBCUTANEOUS at 05:12

## 2022-11-01 RX ADMIN — Medication 40 MILLIGRAM(S): at 05:11

## 2022-11-01 RX ADMIN — Medication 25 MILLIGRAM(S): at 05:12

## 2022-11-01 RX ADMIN — Medication 1 MILLIGRAM(S): at 11:04

## 2022-11-01 RX ADMIN — SODIUM ZIRCONIUM CYCLOSILICATE 10 GRAM(S): 10 POWDER, FOR SUSPENSION ORAL at 11:04

## 2022-11-01 RX ADMIN — Medication 650 MILLIGRAM(S): at 15:05

## 2022-11-01 RX ADMIN — SODIUM ZIRCONIUM CYCLOSILICATE 10 GRAM(S): 10 POWDER, FOR SUSPENSION ORAL at 00:35

## 2022-11-01 RX ADMIN — Medication 650 MILLIGRAM(S): at 05:12

## 2022-11-01 NOTE — MEDICAL STUDENT PROGRESS NOTE(EDUCATION) - SUBJECTIVE AND OBJECTIVE BOX
77 year old male with history of DM, HTN, CKD, HFpEF, and chronic LE edema presented to ED for syncopal episode in nursing home.    There were no overnight events for the patient. He states that he has been feeling alright and has been eating and sleeping well. He did not have any bowel movements yesterday but has had a lot of gas. He has been urinating about every 2 hours which he says is due to Lasix. He has been continuously wearing compression bandages.    Pt is ready for discharge and will follow up with cardio outpatient. 77 year old male with history of DM, HTN, CKD, HFpEF, and chronic LE edema presented to ED for syncopal episode in nursing home.    There were no overnight events for the patient. He states that he has been feeling alright and has been eating and sleeping well. He did not have any bowel movements yesterday but has had a lot of gas. He has been urinating about every 2 hours which he says is due to Lasix. He has been continuously wearing compression bandages.    Pt is ready for discharge and will follow up with cardio outpatient.    OBJECTIVE  PAST MEDICAL & SURGICAL HISTORY  CKD (chronic kidney disease)    Hypertension    Type 2 diabetes mellitus                                                ALLERGIES:  No Known Allergies                           HOME MEDICATIONS  Home Medications:  amLODIPine 5 mg oral tablet: 1 tab(s) orally once a day (28 Oct 2022 04:41)  furosemide 40 mg oral tablet: 1 tab(s) orally once a day (28 Oct 2022 04:41)  insulin aspart 100 units/mL injectable solution: 10 unit(s) injectable 3 times a day (with meals) (28 Oct 2022 04:41)  lactulose 10 g/15 mL oral syrup: 30 milliliter(s) orally 2 times a day (28 Oct 2022 04:41)  metoprolol succinate 25 mg oral tablet, extended release: 1 tab(s) orally once a day (28 Oct 2022 04:41)  polyethylene glycol 3350 oral powder for reconstitution: 17 gram(s) orally 2 times a day (28 Oct 2022 04:41)  Silvadene 1% topical cream: Apply topically to affected area once a day to left thigh and to sacrum (28 Oct 2022 04:41)  sodium bicarbonate 650 mg oral tablet: 2 tab(s) orally 3 times a day (28 Oct 2022 04:41)                           MEDICATIONS:  STANDING MEDICATIONS  folic acid 1 milliGRAM(s) Oral daily  furosemide    Tablet 40 milliGRAM(s) Oral daily  heparin   Injectable 5000 Unit(s) SubCutaneous every 12 hours  metoprolol succinate ER 25 milliGRAM(s) Oral daily  silver sulfADIAZINE 1% Cream 1 Application(s) Topical daily  sodium bicarbonate 650 milliGRAM(s) Oral every 8 hours  sodium zirconium cyclosilicate 10 mg oral once Stop after 1 time    PRN MEDICATIONS  acetaminophen     Tablet .. 650 milliGRAM(s) Oral every 6 hours PRN      PHYSICAL EXAM:  GENERAL: NAD, lying in bed comfortably  HEAD:  Atraumatic, Normocephalic  EYES: EOMI, PERRLA, conjunctiva and sclera clear  ENT: Moist mucous membranes  NECK: Supple, No JVD  CHEST/LUNG: Clear to auscultation bilaterally; No rales, rhonchi, wheezing, or rubs. Unlabored respirations  HEART: Regular rate and rhythm; No murmurs, rubs, or gallops  ABDOMEN: BSx4; Soft, nontender, nondistended  EXTREMITIES:  2+ Peripheral Pulses, brisk capillary refill. No clubbing, cyanosis, or edema  NERVOUS SYSTEM:  A&Ox3, no focal deficits   SKIN: No rashes or lesions

## 2022-11-01 NOTE — MEDICAL STUDENT PROGRESS NOTE(EDUCATION) - NS MD HP STUD ASPLAN PLAN FT
#syncope likely due to orthostatic hypotension  - CT head unremarkable  - EEG negative  - EKG: NSR  - no events on telemetry  - orthostatics positive - improving, check daily  - echo from 10/13/22: EF 70 to 75%, hyperdynamic global left ventricular systolic function, dilatation of ascending aorta at 4.2 cm, grade 1 DD  - no need for repeat THANH  - c/w metoprolol  - c/w compression stockings  - will f/u w/ cardio outpatient    #CKD, stage 4  #hyperkalemia  #anemia of chronic disease  - low k diet  - start Lokelma 5 mg BID  - c/w w/ Lasix for now  - cont. sodium bicarb    #TAA  - echo 10/13: 4.2 cm dilatation of ascending aorta  - f/u w/ repeat CTA as outpatient    #HX of DM2  - last A1c of 5.9  - start ISS if FS > 180  - last glucose 98 as of 11/1/22 08:01    #HTN  - cont. amlodipine 5 mg QD, BB    DVT PPX: heparin q12    Disposition: SNF #syncope likely due to orthostatic hypotension  - CT head unremarkable  - EEG negative  - EKG: NSR  - no events on telemetry  - orthostatics positive - improving, check daily  - echo from 10/13/22: EF 70 to 75%, hyperdynamic global left ventricular systolic function, dilatation of ascending aorta at 4.2 cm, grade 1 DD  - no need for repeat THANH  - c/w metoprolol  - c/w compression stockings  - will f/u w/ cardio outpatient    #CKD, stage 4  #hyperkalemia  #anemia of chronic disease  - low k diet  - start Lokelma 5 mg BID  - c/w Lasix  - c/w sodium bicarb    #TAA  - echo 10/13: 4.2 cm dilatation of ascending aorta  - f/u w/ repeat CTA as outpatient    #HX of DM2  - last A1c of 5.9  - start ISS if FS > 180  - last glucose 98 as of 11/1/22 08:01    #HTN  - cont. amlodipine 5 mg QD, BB    DVT PPX: heparin q12    Disposition: SNF

## 2022-11-01 NOTE — PROGRESS NOTE ADULT - ASSESSMENT
77 year old male with a history of DM, HTN, CKD and HFpEF, chronic LE edema presented to the ED for syncopal episode in the nursing home.    # Syncope  - CT head unremarkable  - EEG negative   - EKG: NSR   - No events on telemetry   - orthostatics positive - improving, check daily   - echo from 10.13.22: EF 70 to 75%, Hyperdynamic global left ventricular systolic function, Dilatation of the ascending aorta at 4.2 cm., grade 1 DD  - No need for repeat TTE     # CKD Stage 4  # Hyperkalemia   # Anemia of chronic disease   - low k diet  - start Lokelma   - c/w Lasix for now  - Cont sodium bicarb    # TAA  - Echo 10/13: 4.2cm dilatation of ascending aorta    # Hx of DM2?  - last A1c of 5.9  - Start ISS if FS > 180    # HTN  - Cont amlodipine 5mg qD, BB    DVT PPX, heparin q 12    Pending: Hyperkalemia, dc planning to STR vs Home in 24 hours   Plan of care d/w patient   Dispo: STR vs Home 
77 year old male with a history of DM, HTN, CKD and HFpEF, chronic LE edema presented to the ED for syncopal episode in the nursing home.    # Syncope likely  due to ? Orthostatic hypotension vs Arrythmia  - CT head unremarkable  - EEG negative   - EKG: NSR   - No events on telemetry   - orthostatics positive - improving,    - echo from 10.13.22: EF 70 to 75%, Hyperdynamic global left ventricular systolic function, Dilatation of the ascending aorta at 4.2 cm., grade 1 DD  - No need for repeat TTE   - Cardio Input noted. Will adjust meds, add Compression stockings     # CKD Stage 4  # Hyperkalemia   # Anemia of chronic disease   - low k diet  - start Lokelma   - c/w Lasix for now  - Cont sodium bicarb    # TAA  - Echo 10/13: 4.2cm dilatation of ascending aorta  - follow up with repeat CTA as outpt    # Hx of DM2?  - last A1c of 5.9  - Start ISS if FS > 180    # HTN  - Cont amlodipine 5mg qD, BB    DVT PPX, heparin q 12    DC planning for today .    #Progress Note Handoff  Pending (specify):   Family discussion:  Disposition: SNF_
77 year old male with a history of DM, HTN, CKD and HFpEF, chronic LE edema presented to the ED for syncopal episode in the nursing home.    # Syncope due to ? Orthostatic hypotension vs Arrythmia  - CT head unremarkable  - EEG negative   - EKG: NSR   - No events on telemetry   - orthostatics positive - improving, check daily   - echo from 10.13.22: EF 70 to 75%, Hyperdynamic global left ventricular systolic function, Dilatation of the ascending aorta at 4.2 cm., grade 1 DD  - No need for repeat TTE   - pending  Cardio Input re Loop recorder     # CKD Stage 4  # Hyperkalemia   # Anemia of chronic disease   - low k diet  - start Lokelma   - c/w Lasix for now  - Cont sodium bicarb    # TAA  - Echo 10/13: 4.2cm dilatation of ascending aorta  - follow up with repeat CTA as outpt    # Hx of DM2?  - last A1c of 5.9  - Start ISS if FS > 180    # HTN  - Cont amlodipine 5mg qD, BB    DVT PPX, heparin q 12    #Progress Note Handoff  Pending (specify):  Cardio /PT   Family discussion:  Disposition: SNF_
77 year old male with a history of DM, HTN, CKD and HFpEF, chronic LE edema presented to the ED for syncopal episode in the nursing home.    #Syncope  - CT head unremarkable  - EKG: Accelerated Junctional rhythm  - Xray chest: no sig change since prior  - orthostats positive; start LR 75cc/hr  - echo from 10.13.22: EF 70 to 75%, Hyperdynamic global left ventricular systolic function, Dilatation of the ascending aorta at 4.2 cm., grade 1 DD  - f/u EEG      # CKD Stage 4  # Hyperkalemia   # Anemia of chronic disease   - baseline around 3  - hold lasix for now  - Cont sodium bicarb    # TAA  - Echo 10/13: 4.2cm dilatation of ascending aorta  - Will need outpatient f/u    # Hx of DM2?  - last A1c of 5.9  - Start ISS if FS > 180    # HTN  - Cont amlodipine 5mg qD, BB    GI prophylaxis: none   DVT PPX, heparin q 12  Ambulate as tolerated  Full code   DASH/ TLC

## 2022-11-01 NOTE — PROGRESS NOTE ADULT - SUBJECTIVE AND OBJECTIVE BOX
SULEMAN NEVIN  77y  Male      Patient is a 77y old  Male who presents with a chief complaint of Syncope 	      INTERVAL HPI/OVERNIGHT EVENTS:      ******************************* REVIEW OF SYSTEMS:**********************************************    All other review of systems negative    *********************** VITALS ******************************************    T(F): 96.7 (10-31-22 @ 05:00)  HR: 75 (10-31-22 @ 05:00) (75 - 84)  BP: 126/60 (10-31-22 @ 05:00) (118/58 - 132/62)  RR: 18 (10-31-22 @ 05:00) (17 - 19)  SpO2: 97% (10-30-22 @ 20:04) (97% - 98%)    10-30-22 @ 07:01  -  10-31-22 @ 07:00  --------------------------------------------------------  IN: 180 mL / OUT: 401 mL / NET: -221 mL            10-30-22 @ 07:01  -  10-31-22 @ 07:00  --------------------------------------------------------  IN: 180 mL / OUT: 401 mL / NET: -221 mL        ******************************** PHYSICAL EXAM:**************************************************  GENERAL: NAD    PSYCH: no agitation, baseline mentation  HEENT:     NERVOUS SYSTEM:  Alert & Oriented X3,   PULMONARY: VERA, CTA    CARDIOVASCULAR: S1S2 RRR    GI: Soft, NT, ND; BS present.    EXTREMITIES:  2+ Peripheral Pulses, No clubbing, cyanosis, or edema    LYMPH: No lymphadenopathy noted    SKIN: No rashes or lesions      **************************** LABS *******************************************************                          8.0    7.35  )-----------( 362      ( 31 Oct 2022 06:33 )             25.1     10-31    140  |  106  |  48<H>  ----------------------------<  102<H>  5.2<H>   |  22  |  3.2<H>    Ca    8.4      31 Oct 2022 06:33  Mg     2.0     10-31    TPro  5.9<L>  /  Alb  3.1<L>  /  TBili  <0.2  /  DBili  x   /  AST  25  /  ALT  21  /  AlkPhos  280<H>  10-31          Lactate Trend        CAPILLARY BLOOD GLUCOSE      POCT Blood Glucose.: 162 mg/dL (31 Oct 2022 11:27)          **************************Active Medications *******************************************  No Known Allergies      acetaminophen     Tablet .. 650 milliGRAM(s) Oral every 6 hours PRN  folic acid 1 milliGRAM(s) Oral daily  furosemide    Tablet 40 milliGRAM(s) Oral daily  heparin   Injectable 5000 Unit(s) SubCutaneous every 12 hours  metoprolol succinate ER 25 milliGRAM(s) Oral daily  silver sulfADIAZINE 1% Cream 1 Application(s) Topical daily  sodium bicarbonate 650 milliGRAM(s) Oral every 8 hours  sodium zirconium cyclosilicate 10 Gram(s) Oral once      ***************************************************  RADIOLOGY & ADDITIONAL TESTS:    Imaging Personally Reviewed:  [ ] YES  [ ] NO    HEALTH ISSUES - PROBLEM Dx:      
  SULEMAN NEVIN  77y  Male      Patient is a 77y old  Male who presents with a chief complaint of Syncope       INTERVAL HPI/OVERNIGHT EVENTS:      ******************************* REVIEW OF SYSTEMS:**********************************************    All other review of systems negative    *********************** VITALS ******************************************    T(F): 97 (11-01-22 @ 05:56)  HR: 74 (11-01-22 @ 05:56) (69 - 78)  BP: 124/56 (11-01-22 @ 05:56) (106/59 - 124/56)  RR: 18 (11-01-22 @ 05:56) (18 - 18)  SpO2: 96% (11-01-22 @ 05:56) (96% - 98%)    10-31-22 @ 07:01  -  11-01-22 @ 07:00  --------------------------------------------------------  IN: 0 mL / OUT: 400 mL / NET: -400 mL            10-31-22 @ 07:01  -  11-01-22 @ 07:00  --------------------------------------------------------  IN: 0 mL / OUT: 400 mL / NET: -400 mL        ******************************** PHYSICAL EXAM:**************************************************  GENERAL: NAD    PSYCH: no agitation, baseline mentation  HEENT:     NERVOUS SYSTEM:  Alert & Oriented X2-3,   PULMONARY: VERA, CTA    CARDIOVASCULAR: S1S2 RRR    GI: Soft, NT, ND; BS present.    EXTREMITIES:  2+ Peripheral Pulses, No clubbing, cyanosis, or edema    LYMPH: No lymphadenopathy noted    SKIN: No rashes or lesions      **************************** LABS *******************************************************                          8.5    7.93  )-----------( 373      ( 01 Nov 2022 05:38 )             26.7     11-01    140  |  106  |  47<H>  ----------------------------<  105<H>  5.1<H>   |  22  |  2.9<H>    Ca    8.7      01 Nov 2022 05:38  Mg     1.9     11-01    TPro  6.2  /  Alb  3.2<L>  /  TBili  <0.2  /  DBili  x   /  AST  31  /  ALT  26  /  AlkPhos  303<H>  11-01          Lactate Trend        CAPILLARY BLOOD GLUCOSE      POCT Blood Glucose.: 125 mg/dL (01 Nov 2022 11:20)          **************************Active Medications *******************************************  No Known Allergies      acetaminophen     Tablet .. 650 milliGRAM(s) Oral every 6 hours PRN  folic acid 1 milliGRAM(s) Oral daily  furosemide    Tablet 40 milliGRAM(s) Oral daily  heparin   Injectable 5000 Unit(s) SubCutaneous every 12 hours  metoprolol succinate ER 25 milliGRAM(s) Oral daily  silver sulfADIAZINE 1% Cream 1 Application(s) Topical daily  sodium bicarbonate 650 milliGRAM(s) Oral every 8 hours  sodium zirconium cyclosilicate 5 Gram(s) Oral two times a day      ***************************************************  RADIOLOGY & ADDITIONAL TESTS:    Imaging Personally Reviewed:  [ ] YES  [ ] NO    HEALTH ISSUES - PROBLEM Dx:      
----------Daily Progress Note----------    HISTORY OF PRESENT ILLNESS:  Patient is a 77y old Male who presents with a chief complaint of   Currently admitted to medicine with the primary diagnosis of Syncope       Today is hospital day 1d.     INTERVAL HOSPITAL COURSE / OVERNIGHT EVENTS:    No overnight events, no complaints    Review of Systems: Otherwise unremarkable     <<<<<PAST MEDICAL & SURGICAL HISTORY>>>>>  CKD (chronic kidney disease)    Hypertension    Type 2 diabetes mellitus      ALLERGIES  No Known Allergies      Home Medications:  amLODIPine 5 mg oral tablet: 1 tab(s) orally once a day (28 Oct 2022 04:41)  furosemide 40 mg oral tablet: 1 tab(s) orally once a day (28 Oct 2022 04:41)  insulin aspart 100 units/mL injectable solution: 10 unit(s) injectable 3 times a day (with meals) (28 Oct 2022 04:41)  lactulose 10 g/15 mL oral syrup: 30 milliliter(s) orally 2 times a day (28 Oct 2022 04:41)  metoprolol succinate 25 mg oral tablet, extended release: 1 tab(s) orally once a day (28 Oct 2022 04:41)  polyethylene glycol 3350 oral powder for reconstitution: 17 gram(s) orally 2 times a day (28 Oct 2022 04:41)  Silvadene 1% topical cream: Apply topically to affected area once a day to left thigh and to sacrum (28 Oct 2022 04:41)  sodium bicarbonate 650 mg oral tablet: 2 tab(s) orally 3 times a day (28 Oct 2022 04:41)        MEDICATIONS  STANDING MEDICATIONS  cefTRIAXone   IVPB      cefTRIAXone   IVPB 1000 milliGRAM(s) IV Intermittent every 24 hours  folic acid 1 milliGRAM(s) Oral daily  heparin   Injectable 5000 Unit(s) SubCutaneous every 12 hours  lactated ringers. 1000 milliLiter(s) IV Continuous <Continuous>  metoprolol succinate ER 25 milliGRAM(s) Oral daily  silver sulfADIAZINE 1% Cream 1 Application(s) Topical daily  sodium bicarbonate 650 milliGRAM(s) Oral every 8 hours    PRN MEDICATIONS  acetaminophen     Tablet .. 650 milliGRAM(s) Oral every 6 hours PRN    VITALS:  T(F): 96.8  HR: 81  BP: 125/60  RR: 18  SpO2: 93%    <<<<<LABS>>>>>                        8.5    7.45  )-----------( 468      ( 29 Oct 2022 04:30 )             27.5     10-29    139  |  103  |  49<H>  ----------------------------<  100<H>  5.2<H>   |  22  |  3.1<H>    Ca    8.6      29 Oct 2022 04:30  Mg     1.9     10-29    TPro  6.4  /  Alb  3.3<L>  /  TBili  <0.2  /  DBili  x   /  AST  17  /  ALT  18  /  AlkPhos  266<H>  10-29      Urinalysis Basic - ( 28 Oct 2022 06:20 )    Color: Yellow / Appearance: Clear / S.010 / pH: x  Gluc: x / Ketone: Negative  / Bili: Negative / Urobili: <2 mg/dL   Blood: x / Protein: 30 mg/dL / Nitrite: Positive   Leuk Esterase: Large / RBC: 2 /HPF / WBC 92 /HPF   Sq Epi: x / Non Sq Epi: 1 /HPF / Bacteria: Many        Troponin T, Serum: 0.09 ng/mL *HH* (10-29-22 @ 04:30)  Troponin T, Serum: 0.09 ng/mL *HH* (10-28-22 @ 11:38)    [id number]  CARDIAC MARKERS ( 29 Oct 2022 04:30 )  x     / 0.09 ng/mL / x     / x     / x      CARDIAC MARKERS ( 28 Oct 2022 11:38 )  x     / 0.09 ng/mL / x     / x     / x      CARDIAC MARKERS ( 28 Oct 2022 05:32 )  x     / 0.08 ng/mL / x     / x     / x            <<<<<RADIOLOGY>>>>>    < from: CT Head No Cont (10.28.22 @ 03:26) >  PROCEDURE DATE:  10/28/2022          INTERPRETATION:  CLINICAL INDICATION: Syncope    Technique: CT of the head was performed without contrast.    Multiple contiguous axial images were acquiredfrom the skullbase to the   vertex without the administration of intravenous contrast.  Coronal and   sagittal reformations were made.    COMPARISON: None available    FINDINGS:    The ventricles and sulci are unremarkable in appearance for age.    There are patchy hypodensities in the periventricular and subcortical   white matter, likely representing mild chronic microvascular ischemic   changes.    There is no intraparenchymal hematoma, mass effect or midline shift. No   abnormal extra-axial fluid collections are present.    The calvarium is intact. The visualized intraorbital compartments,   paranasal sinuses and mastoid complexes appear free of acute disease.    IMPRESSION:  No evidence of acute intracranial pathology.    < end of copied text >      <<<<<PHYSICAL EXAM>>>>>  GENERAL: NAD, resting comfortably in bed  PULMONARY: Clear to auscultation bilaterally. No rales, rhonchi, or wheezing.  CARDIOVASCULAR: Regular rate and rhythm, S1-S2, no murmurs  GASTROINTESTINAL: Soft, non-tender, non-distended, no guarding.  SKIN/EXTREMITIES: No LE edema b/l  NEUROLOGIC: AAOX3      -----------------------------------------------------------------------------------------------------------------------------------------------------------------------------------------------
   Pt seen and examined at bedside.    VITAL SIGNS (Last 24 hrs):  T(C): 37.2 (10-30-22 @ 04:03), Max: 37.2 (10-30-22 @ 04:03)  HR: 79 (10-30-22 @ 04:03) (79 - 80)  BP: 142/64 (10-30-22 @ 04:03) (126/58 - 142/64)  RR: 16 (10-30-22 @ 04:03) (16 - 18)  SpO2: --  Wt(kg): --  Daily     Daily Weight in k.9 (30 Oct 2022 04:03)    I&O's Summary    29 Oct 2022 07:  -  30 Oct 2022 07:00  --------------------------------------------------------  IN: 1085 mL / OUT: 1850 mL / NET: -765 mL    30 Oct 2022 07:01  -  30 Oct 2022 12:24  --------------------------------------------------------  IN: 180 mL / OUT: 401 mL / NET: -221 mL        PHYSICAL EXAM:  GENERAL: NAD   HEAD:  Atraumatic, Normocephalic  EYES:   conjunctiva and sclera clear  NECK: Supple, No JVD  CHEST/LUNG: Clear to auscultation bilaterally; No wheeze  HEART: Regular rate and rhythm; No murmurs, rubs, or gallops  ABDOMEN: Soft, Nontender, Nondistended; Bowel sounds present  EXTREMITIES:  2+ Peripheral Pulses, No clubbing, cyanosis, or edema  PSYCH: AAOx3  NEUROLOGY: non-focal  SKIN: No rashes or lesions    Labs Reviewed  Spoke to patient in regards to abnormal labs.    CBC Full  -  ( 30 Oct 2022 06:26 )  WBC Count : 7.28 K/uL  Hemoglobin : 8.3 g/dL  Hematocrit : 26.1 %  Platelet Count - Automated : 398 K/uL  Mean Cell Volume : 89.1 fL  Mean Cell Hemoglobin : 28.3 pg  Mean Cell Hemoglobin Concentration : 31.8 g/dL  Auto Neutrophil # : 4.10 K/uL  Auto Lymphocyte # : 2.13 K/uL  Auto Monocyte # : 0.56 K/uL  Auto Eosinophil # : 0.37 K/uL  Auto Basophil # : 0.08 K/uL  Auto Neutrophil % : 56.3 %  Auto Lymphocyte % : 29.3 %  Auto Monocyte % : 7.7 %  Auto Eosinophil % : 5.1 %  Auto Basophil % : 1.1 %    BMP:    10-30 @ 06:26    Blood Urea Nitrogen - 46  Calcium - 8.7  Carbond Dioxide - 24  Chloride - 106  Creatinine - 3.1  Glucose - 89  Potassium - 5.6  Sodium - 142       c -     Urine Culture:  10-28 @ 06:20 Urine culture: --    Culture Results:   >100,000 CFU/ml Gram Negative Rods  Method Type: --  Organism: --  Organism Identification: --  Specimen Source: Clean Catch Clean Catch (Midstream)        Imaging reviewed    EKG reviewed    Tele Reviewed    MEDICATIONS  (STANDING):  folic acid 1 milliGRAM(s) Oral daily  furosemide    Tablet 40 milliGRAM(s) Oral daily  heparin   Injectable 5000 Unit(s) SubCutaneous every 12 hours  metoprolol succinate ER 25 milliGRAM(s) Oral daily  silver sulfADIAZINE 1% Cream 1 Application(s) Topical daily  sodium bicarbonate 650 milliGRAM(s) Oral every 8 hours  sodium zirconium cyclosilicate 5 Gram(s) Oral three times a day    MEDICATIONS  (PRN):  acetaminophen     Tablet .. 650 milliGRAM(s) Oral every 6 hours PRN Temp greater or equal to 38C (100.4F), Mild Pain (1 - 3)  
NEVIN SHELL 77y Male  MRN#: [id number]   CODE STATUS:________    Hospital Day: 3d    Pt is currently admitted with the primary diagnosis of     SUBJECTIVE  Hospital Course    Overnight events     Subjective complaints     Present Today:   - Cruz:  No [  ], Yes [   ] : Indication:     - Type of IV Access:       .. CVC/Piccline:  No [  ], Yes [   ] : Indication:       .. Midline: No [  ], Yes [   ] : Indication:                                              OBJECTIVE  PAST MEDICAL & SURGICAL HISTORY  CKD (chronic kidney disease)    Hypertension    Type 2 diabetes mellitus                                                ALLERGIES:  No Known Allergies                           HOME MEDICATIONS  Home Medications:  amLODIPine 5 mg oral tablet: 1 tab(s) orally once a day (28 Oct 2022 04:41)  furosemide 40 mg oral tablet: 1 tab(s) orally once a day (28 Oct 2022 04:41)  insulin aspart 100 units/mL injectable solution: 10 unit(s) injectable 3 times a day (with meals) (28 Oct 2022 04:41)  lactulose 10 g/15 mL oral syrup: 30 milliliter(s) orally 2 times a day (28 Oct 2022 04:41)  metoprolol succinate 25 mg oral tablet, extended release: 1 tab(s) orally once a day (28 Oct 2022 04:41)  polyethylene glycol 3350 oral powder for reconstitution: 17 gram(s) orally 2 times a day (28 Oct 2022 04:41)  Silvadene 1% topical cream: Apply topically to affected area once a day to left thigh and to sacrum (28 Oct 2022 04:41)  sodium bicarbonate 650 mg oral tablet: 2 tab(s) orally 3 times a day (28 Oct 2022 04:41)                           MEDICATIONS:  STANDING MEDICATIONS  folic acid 1 milliGRAM(s) Oral daily  furosemide    Tablet 40 milliGRAM(s) Oral daily  heparin   Injectable 5000 Unit(s) SubCutaneous every 12 hours  metoprolol succinate ER 25 milliGRAM(s) Oral daily  silver sulfADIAZINE 1% Cream 1 Application(s) Topical daily  sodium bicarbonate 650 milliGRAM(s) Oral every 8 hours    PRN MEDICATIONS  acetaminophen     Tablet .. 650 milliGRAM(s) Oral every 6 hours PRN                                            ------------------------------------------------------------  VITAL SIGNS: Last 24 Hours  T(C): 35.9 (31 Oct 2022 05:00), Max: 36.1 (30 Oct 2022 17:35)  T(F): 96.7 (31 Oct 2022 05:00), Max: 96.9 (30 Oct 2022 17:35)  HR: 75 (31 Oct 2022 05:00) (75 - 84)  BP: 126/60 (31 Oct 2022 05:00) (118/58 - 132/62)  BP(mean): --  RR: 18 (31 Oct 2022 05:00) (17 - 19)  SpO2: 97% (30 Oct 2022 20:04) (97% - 98%)      10-30-22 @ 07:01  -  10-31-22 @ 07:00  --------------------------------------------------------  IN: 180 mL / OUT: 401 mL / NET: -221 mL                                               LABS:                        8.0    7.35  )-----------( 362      ( 31 Oct 2022 06:33 )             25.1     10-30    142  |  106  |  46<H>  ----------------------------<  89  5.6<H>   |  24  |  3.1<H>    Ca    8.7      30 Oct 2022 06:26  Mg     1.7     10-30    TPro  6.2  /  Alb  3.2<L>  /  TBili  <0.2  /  DBili  x   /  AST  28  /  ALT  21  /  AlkPhos  287<H>  10-30                                                              RADIOLOGY:        PHYSICAL EXAM:  GENERAL: NAD, lying in bed comfortably  HEAD:  Atraumatic, Normocephalic  EYES: EOMI, PERRLA, conjunctiva and sclera clear  ENT: Moist mucous membranes  NECK: Supple, No JVD  CHEST/LUNG: Clear to auscultation bilaterally; No rales, rhonchi, wheezing, or rubs. Unlabored respirations  HEART: Regular rate and rhythm; No murmurs, rubs, or gallops  ABDOMEN: BSx4; Soft, nontender, nondistended  EXTREMITIES:  2+ Peripheral Pulses, brisk capillary refill. No clubbing, cyanosis, or edema  NERVOUS SYSTEM:  A&Ox3, no focal deficits   SKIN: No rashes or lesions                                         ASSESSMENT /Plan    77 year old male with a history of DM, HTN, CKD and HFpEF, chronic LE edema presented to the ED for syncopal episode in the nursing home.    # Syncope  - CT head unremarkable  - EEG negative   - EKG: NSR   - No events on telemetry   - orthostatics positive - improving, check daily   - echo from 10.13.22: EF 70 to 75%, Hyperdynamic global left ventricular systolic function, Dilatation of the ascending aorta at 4.2 cm., grade 1 DD  - No need for repeat TTE     # CKD Stage 4  # Hyperkalemia   # Anemia of chronic disease   - low k diet  - start Lokelma   - c/w Lasix for now  - Cont sodium bicarb    # TAA  - Echo 10/13: 4.2cm dilatation of ascending aorta    # Hx of DM2?  - last A1c of 5.9  - Start ISS if FS > 180    # HTN  - Cont amlodipine 5mg qD, BB    DVT PPX, heparin q 12

## 2022-11-01 NOTE — DISCHARGE NOTE NURSING/CASE MANAGEMENT/SOCIAL WORK - PATIENT PORTAL LINK FT
You can access the FollowMyHealth Patient Portal offered by [facility] by registering at the following website: [email or web address] By joining [facility]’s FollowMyHealth portal, you will also be able to view your health information using other applications (apps) compatible with our system.

## 2022-11-01 NOTE — MEDICAL STUDENT PROGRESS NOTE(EDUCATION) - NS MD HP STUD ASPLAN ASSES FT
syncope likely due to orthostatic hypotension  CKD stage 4  hyperkalemia  anemia of chronic disease  TAA  Hx of DM2  HTN

## 2022-11-01 NOTE — DISCHARGE NOTE NURSING/CASE MANAGEMENT/SOCIAL WORK - NSDCPEFALRISK_GEN_ALL_CORE
For information on Fall & Injury Prevention, visit: [email or web address] OR  [email or web address] OR  https://www.cdc.gov/steadi/patient.html

## 2022-11-03 PROBLEM — Z00.00 ENCOUNTER FOR PREVENTIVE HEALTH EXAMINATION: Status: ACTIVE | Noted: 2022-11-03

## 2022-12-01 NOTE — PROGRESS NOTE ADULT - ASSESSMENT
----- Message from Dolores Brumfield MD sent at 11/30/2022  3:06 PM CST -----  Please inform pt   Mammogram / ultrasound of the breast shows a probably benign asymmetry in the right breast   No evidence of malignancy   Recommend a follow up ultrasound in 6 months to ensure stable (future order placed)    Pt with DM, HTN, CKD 4, presents with increased LE edema and exertional dyspnes.    CKD 4 - near baseline of creat ~3 per pt / follows with Dr. Lozoya  Kidney and bladders sono - no hydro, thin left cortex  UA proteinuria 30, etiology likely HTN, DM / urine pr/cr ratio 0.6g/g  check serum flc ratio, spep/sife  pt c/o orthopnea/PND, increase lasix to q12h dosing  strict Is and OS  phos level ok, no need for phos binder  Hyperkalemia (K hemolyzed today).  D/c lokelma.  Maintain on low K diet  Increase sodium bicarb to 1300mg q8h  TTE reviewed  Anemia - check iron stores please, will need Venofer and DANIELLA most likely

## 2022-12-02 RX ORDER — INSULIN ASPART 100 [IU]/ML
10 INJECTION, SOLUTION SUBCUTANEOUS
Qty: 0 | Refills: 0 | DISCHARGE
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
## 2024-02-16 NOTE — PHYSICAL THERAPY INITIAL EVALUATION ADULT - ASSISTIVE DEVICE:SUPINE/SIT, REHAB EVAL
Pt has been accepted to residential treatment by JAREN Rojo.   Nurse to nurse was completed from [facility] to [facility].    Patients wife will be driving patient to the [facility] and are on the way.     bed rails

## 2025-04-27 NOTE — SWALLOW BEDSIDE ASSESSMENT ADULT - POSITIONING
The patient's goals for the shift include Pain control    The clinical goals for the shift include Pt will remain safe and have adequate pain control throughout the shift    Over the shift, the patient  will continue to progress towards his care plan goals   upright (90 degrees)